# Patient Record
Sex: MALE
[De-identification: names, ages, dates, MRNs, and addresses within clinical notes are randomized per-mention and may not be internally consistent; named-entity substitution may affect disease eponyms.]

---

## 2019-12-30 ENCOUNTER — HOSPITAL ENCOUNTER (INPATIENT)
Dept: HOSPITAL 92 - ERS | Age: 29
LOS: 7 days | Discharge: HOME | DRG: 871 | End: 2020-01-06
Attending: INTERNAL MEDICINE | Admitting: INTERNAL MEDICINE
Payer: COMMERCIAL

## 2019-12-30 VITALS — BODY MASS INDEX: 79.9 KG/M2

## 2019-12-30 DIAGNOSIS — J96.22: ICD-10-CM

## 2019-12-30 DIAGNOSIS — E87.2: ICD-10-CM

## 2019-12-30 DIAGNOSIS — G89.29: ICD-10-CM

## 2019-12-30 DIAGNOSIS — E66.2: ICD-10-CM

## 2019-12-30 DIAGNOSIS — L03.116: ICD-10-CM

## 2019-12-30 DIAGNOSIS — E87.1: ICD-10-CM

## 2019-12-30 DIAGNOSIS — R65.20: ICD-10-CM

## 2019-12-30 DIAGNOSIS — Z79.899: ICD-10-CM

## 2019-12-30 DIAGNOSIS — M54.5: ICD-10-CM

## 2019-12-30 DIAGNOSIS — A41.9: Primary | ICD-10-CM

## 2019-12-30 DIAGNOSIS — J96.21: ICD-10-CM

## 2019-12-30 DIAGNOSIS — Z23: ICD-10-CM

## 2019-12-30 LAB
ALBUMIN SERPL BCG-MCNC: 3.6 G/DL (ref 3.5–5)
ALP SERPL-CCNC: 89 U/L (ref 40–110)
ALT SERPL W P-5'-P-CCNC: 26 U/L (ref 8–55)
ANION GAP SERPL CALC-SCNC: 10 MMOL/L (ref 10–20)
AST SERPL-CCNC: 21 U/L (ref 5–34)
BILIRUB SERPL-MCNC: 0.7 MG/DL (ref 0.2–1.2)
BUN SERPL-MCNC: 12 MG/DL (ref 8.9–20.6)
CALCIUM SERPL-MCNC: 8.5 MG/DL (ref 7.8–10.44)
CHLORIDE SERPL-SCNC: 96 MMOL/L (ref 98–107)
CO2 SERPL-SCNC: 31 MMOL/L (ref 22–29)
CREAT CL PREDICTED SERPL C-G-VRATE: 0 ML/MIN (ref 70–130)
GLOBULIN SER CALC-MCNC: 4.1 G/DL (ref 2.4–3.5)
GLUCOSE SERPL-MCNC: 107 MG/DL (ref 70–105)
HGB BLD-MCNC: 15.8 G/DL (ref 14–18)
MCH RBC QN AUTO: 26.3 PG (ref 27–31)
MCV RBC AUTO: 84.8 FL (ref 78–98)
MDIFF COMPLETE?: YES
PLATELET # BLD AUTO: 193 THOU/UL (ref 130–400)
POTASSIUM SERPL-SCNC: 3.9 MMOL/L (ref 3.5–5.1)
RBC # BLD AUTO: 6 MILL/UL (ref 4.7–6.1)
SODIUM SERPL-SCNC: 133 MMOL/L (ref 136–145)
WBC # BLD AUTO: 28 THOU/UL (ref 4.8–10.8)

## 2019-12-30 PROCEDURE — 96361 HYDRATE IV INFUSION ADD-ON: CPT

## 2019-12-30 PROCEDURE — 80202 ASSAY OF VANCOMYCIN: CPT

## 2019-12-30 PROCEDURE — 96366 THER/PROPH/DIAG IV INF ADDON: CPT

## 2019-12-30 PROCEDURE — 85025 COMPLETE CBC W/AUTO DIFF WBC: CPT

## 2019-12-30 PROCEDURE — 87040 BLOOD CULTURE FOR BACTERIA: CPT

## 2019-12-30 PROCEDURE — 80053 COMPREHEN METABOLIC PANEL: CPT

## 2019-12-30 PROCEDURE — 87149 DNA/RNA DIRECT PROBE: CPT

## 2019-12-30 PROCEDURE — 82805 BLOOD GASES W/O2 SATURATION: CPT

## 2019-12-30 PROCEDURE — 83735 ASSAY OF MAGNESIUM: CPT

## 2019-12-30 PROCEDURE — 83605 ASSAY OF LACTIC ACID: CPT

## 2019-12-30 PROCEDURE — 5A09357 ASSISTANCE WITH RESPIRATORY VENTILATION, LESS THAN 24 CONSECUTIVE HOURS, CONTINUOUS POSITIVE AIRWAY PRESSURE: ICD-10-PCS | Performed by: INTERNAL MEDICINE

## 2019-12-30 PROCEDURE — 36415 COLL VENOUS BLD VENIPUNCTURE: CPT

## 2019-12-30 PROCEDURE — 96365 THER/PROPH/DIAG IV INF INIT: CPT

## 2019-12-30 PROCEDURE — 87804 INFLUENZA ASSAY W/OPTIC: CPT

## 2019-12-30 PROCEDURE — 80048 BASIC METABOLIC PNL TOTAL CA: CPT

## 2019-12-30 PROCEDURE — G0008 ADMIN INFLUENZA VIRUS VAC: HCPCS

## 2019-12-30 PROCEDURE — 94660 CPAP INITIATION&MGMT: CPT

## 2019-12-30 PROCEDURE — 71045 X-RAY EXAM CHEST 1 VIEW: CPT

## 2019-12-30 PROCEDURE — 76999 ECHO EXAMINATION PROCEDURE: CPT

## 2019-12-30 PROCEDURE — 90686 IIV4 VACC NO PRSV 0.5 ML IM: CPT

## 2019-12-30 PROCEDURE — 93005 ELECTROCARDIOGRAM TRACING: CPT

## 2019-12-30 PROCEDURE — 90471 IMMUNIZATION ADMIN: CPT

## 2019-12-30 NOTE — RAD
EXAM:

Portable chest



PROVIDED CLINICAL HISTORY:

Dyspnea



COMPARISON:

6/2/2019



FINDINGS:

Cardiac silhouette remains prominently enlarged. No focal consolidation, pleural fluid or pneumothora
x evident.



IMPRESSION:

No evidence for an acute cardiopulmonary process.



Reported By: Niraj Macedo 

Electronically Signed:  12/30/2019 8:31 PM

## 2019-12-31 LAB
ANALYZER IN CARDIO: (no result)
ANION GAP SERPL CALC-SCNC: 9 MMOL/L (ref 10–20)
BASE EXCESS STD BLDA CALC-SCNC: 0.9 MEQ/L
BUN SERPL-MCNC: 10 MG/DL (ref 8.9–20.6)
CA-I BLDA-SCNC: 1.09 MMOL/L (ref 1.12–1.3)
CALCIUM SERPL-MCNC: 8 MG/DL (ref 7.8–10.44)
CHLORIDE SERPL-SCNC: 100 MMOL/L (ref 98–107)
CO2 SERPL-SCNC: 29 MMOL/L (ref 22–29)
CREAT CL PREDICTED SERPL C-G-VRATE: 417 ML/MIN (ref 70–130)
GLUCOSE SERPL-MCNC: 103 MG/DL (ref 70–105)
HCO3 BLDA-SCNC: 28.1 MEQ/L (ref 22–28)
HCT VFR BLDA CALC: 40 % (ref 42–52)
HGB BLDA-MCNC: 13.6 G/DL (ref 14–18)
PCO2 BLDA: 55.8 MMHG (ref 35–45)
PH BLDA: 7.32 [PH] (ref 7.35–7.45)
PO2 BLDA: 54.1 MMHG (ref 80–100)
POTASSIUM BLD-SCNC: 3.71 MMOL/L (ref 3.7–5.3)
POTASSIUM SERPL-SCNC: 4.2 MMOL/L (ref 3.5–5.1)
SODIUM SERPL-SCNC: 134 MMOL/L (ref 136–145)
SPECIMEN DRAWN FROM PATIENT: (no result)
VANCOMYCIN TROUGH SERPL-MCNC: 11.3 UG/ML

## 2019-12-31 RX ADMIN — CEFTRIAXONE SCH MLS: 2 INJECTION, POWDER, FOR SOLUTION INTRAMUSCULAR; INTRAVENOUS at 08:18

## 2019-12-31 NOTE — PDOC.EVN
Event Note





- Event Note


Event Note: 


Patient with progression of the cellulitis. Low grade fever. Persistent 

tachycardia, but no hypotension, actually running high. Lactic acid normalized 

with 3rd liter of NS. Patient groggy after Morphine, though still arousable and 

talking. ABG with mildly dipped pH, CO2 in 50s, low O2. COncerned about the 

possibility of deterioration of respiratory status. Will transfer to the IMCU 

for closer observation and BiPAP if needed. Continue IV fluids, Vancomycin. 

Decrease IV fluids. ID consulted for recurrent cellulitis refractory to 

suppression therapy.

## 2019-12-31 NOTE — HP
PRIMARY CARE PHYSICIAN:  [facility].



CHIEF COMPLAINT:  Generalized weakness.



HISTORY OF PRESENT ILLNESS:  The patient is a 29-year-old male with morbid obesity

with a BMI 79.9, presented to the emergency room with above complaints. 



In September of this year, he was admitted with a diagnosis of sepsis secondary to

left lower extremity cellulitis.  He was discharged on Keflex for 10 days, followed

by Pen-Vee K 250 mg twice daily for 1 year. 



Over the last 3 days, the patient noticed swelling along with warmth and redness of

his left lower extremity that is progressively getting worse.  The symptoms got

worse today.  He started having fever along with chills and generalized weakness

earlier today.  He denies any trauma.  The pain was severe in intensity, worse on

movement.  He denies recent immobilization, or travel.  He is compliant with Keflex

for prophylaxis. 



In the emergency room, initial vital signs showed temperature 103.1, respirations of

34, pulse rate of 125 with a blood pressure of 134/86, O2 saturation of 97% on room

air.  He received IV vancomycin along with IV fluids in the emergency room. 



PAST MEDICAL HISTORY:  

1. Recurrent cellulitis of left lower extremity.

2. Morbid obesity with a BMI of 79.9.

3. Obstructive sleep apnea, on CPAP.

4. Chronic low back pain.

5. Obesity hypoventilation syndrome.



PAST SURGICAL HISTORY:  Reviewed with the patient and none.



ALLERGIES:  NO KNOWN DRUG ALLERGIES.



CURRENT HOME MEDICATIONS:  The patient is currently on antibiotics.  He is unable to

recall the names. 



SOCIAL HISTORY:  The patient currently lives at home.  He is a former smoker.  No

current use of alcohol or drug use. 



FAMILY HISTORY:  Negative for heart disease.



REVIEW OF SYSTEMS:  All other review of systems was reviewed and were found negative.



PHYSICAL EXAMINATION:

VITAL SIGNS:  As discussed above. 

GENERAL:  A 29-year-old male, morbidly obese, in no apparent distress. 

HEENT:  Head, atraumatic and normocephalic.  Sclerae anicteric.  No oral lesion. 

NECK:  Supple.  No JVD.  No carotid bruit. 

LUNGS:  Showed diminished air entry at bilateral bases.  No rhonchi or rales. 

HEART:  S1, S2 present.  Regular.  Tachycardic.  No rubs or gallops. 

ABDOMEN:  Soft, obese.  Bowel sounds present. 

EXTREMITIES:  There is significant swelling along with warmth and tenderness of the

left leg.  There is tenderness on palpation as well. 

SKIN:  As discussed above. 

NEUROLOGIC:  Grossly nonfocal.  Moves all 4 extremities.  Power was 5/5 in all

extremities PSYCHIATRY:  Alert, awake, and oriented x3. 

LYMPH NODES:  No palpable lymph nodes in the neck or groin. 

PERIPHERAL VASCULAR:  Radial pulses and dorsalis pedis pulses were palpable.



LABORATORY FINDINGS:  WBC 28 with 15% bandemia, hemoglobin 15.8.  Chemistry showed

sodium 133, potassium 3.9, chloride 96, bicarb 31.  Lactic acid 2.7.  Total

bilirubin, AST, ALT, and alkaline phosphatase were normal.  Influenza screen was

negative. 



IMAGING STUDIES:  Chest x-ray by my review was negative for infiltrate.  EKG by my

review showed sinus tachycardia. 



IMPRESSION:  

1. Severe sepsis secondary to left lower extremity cellulitis.

2. Morbid obesity with a BMI 79.9 with obesity hypoventilation syndrome.

3. Obstructive sleep apnea, on CPAP at home.

4. Hyponatremia.

5. Lactic acidosis secondary to sepsis.

6. Chronic low back pain.



PLAN:  The patient will be monitored on the medical floor.  We will continue

vancomycin.  We will add ceftriaxone.  Bilateral lower extremity elevation.  Left

lower extremity Doppler has been done, report is pending at this time.  DVT

prophylaxis.  Confirm home medications.  We will resume CPAP.  Continuous pulse

oximetry for now until the CPAP is arranged. 



Plan of care was discussed with the patient.  He stated understanding.







Job ID:  [id number]

## 2019-12-31 NOTE — ULT
PRELIMINARY REPORT/[facility]/AFTER HOURS PROCEDURE

 

LEFT LOWER EXTREMITY VENOUS DUPLEX ULTRASOUND:

 

CLINICAL HISTORY:

LLE pain/edema/redness. Difficult study due to obesity. No LLE DVT seen.

 

TECHNIQUE:

Real-time ultrasound scan of the veins of the left lower extremity with color Doppler flow, spectral 
waveform analysis and compression.

 

COMPARISON:

None provided.

 

FINDINGS:

DEEP VEINS: The common femoral, femoral, and popliteal veins are echolucent and compressible. These v
essels demonstrate respiratory variation and augmentation. There is normal color Doppler flow through
out. The visualized calf veins are also patent.

 

SUPERFICIAL VEINS: The visualized greater saphenous vein is patent.

 

SOFT TISSUES: No popliteal fossa cyst or other abnormalities.

 

IMPRESSION:

No deep venous thrombosis in the left lower extremity.

 

ELECTRONICALLY SIGNED BY:

Elan Tillman MD

Dec 31, 2019 2:15:24 AM CST

 

This report is intended for review by the ordering physician only, in accordance of law. If you recei
ve this report in error, please call [facility] at [phone].

 

 

FINAL REPORT

 

LEFT LOWER EXTREMITY VENOUS DOPPLER ULTRASOUND:

 

I agree with the preliminary report given by Dr. Elan Tillman of [facility].

 

CODE QA

 

POS: [facility]

## 2019-12-31 NOTE — ULT
Ultrasound left thigh:



HISTORY:

29-year-old male with swelling of the left thigh.



FINDINGS:

At the medial aspect of the thigh, there is moderate to severe superficial soft tissue edema. The molly
p soft tissues are not visualized. No organized fluid collection is identified (no evidence of

abscess or hematoma).



IMPRESSION:

Soft tissue edema of the left medial thigh.



Reported By: Sami Abrams 

Electronically Signed:  12/31/2019 5:30 PM

## 2020-01-01 LAB
ANION GAP SERPL CALC-SCNC: 9 MMOL/L (ref 10–20)
BUN SERPL-MCNC: 11 MG/DL (ref 8.9–20.6)
CALCIUM SERPL-MCNC: 8 MG/DL (ref 7.8–10.44)
CHLORIDE SERPL-SCNC: 101 MMOL/L (ref 98–107)
CO2 SERPL-SCNC: 29 MMOL/L (ref 22–29)
CREAT CL PREDICTED SERPL C-G-VRATE: 452 ML/MIN (ref 70–130)
GLUCOSE SERPL-MCNC: 116 MG/DL (ref 70–105)
HGB BLD-MCNC: 12.9 G/DL (ref 14–18)
MCH RBC QN AUTO: 27.3 PG (ref 27–31)
MCV RBC AUTO: 86.4 FL (ref 78–98)
MDIFF COMPLETE?: YES
PLATELET # BLD AUTO: 155 THOU/UL (ref 130–400)
POTASSIUM SERPL-SCNC: 4.2 MMOL/L (ref 3.5–5.1)
RBC # BLD AUTO: 4.71 MILL/UL (ref 4.7–6.1)
SODIUM SERPL-SCNC: 135 MMOL/L (ref 136–145)
WBC # BLD AUTO: 28.9 THOU/UL (ref 4.8–10.8)

## 2020-01-01 PROCEDURE — 3E02340 INTRODUCTION OF INFLUENZA VACCINE INTO MUSCLE, PERCUTANEOUS APPROACH: ICD-10-PCS | Performed by: INTERNAL MEDICINE

## 2020-01-01 RX ADMIN — CEFTRIAXONE SCH MLS: 2 INJECTION, POWDER, FOR SOLUTION INTRAMUSCULAR; INTRAVENOUS at 10:04

## 2020-01-01 NOTE — PDOC.HOSPP
- Subjective


Encounter Date: 01/01/20


Encounter Time: 13:00


Subjective: 


Patient better today. Was on BiPap overnight. Got a little sleepy again this AM 

with tramadol, but not as bad as with morphine last night. Pain in leg 

minimally better.





- Objective


Vital Signs & Weight: 


 Vital Signs (12 hours)











  Temp Pulse Resp Pulse Ox


 


 01/01/20 07:51   85  20  99


 


 01/01/20 07:32     95


 


 01/01/20 07:13  98.0 F   


 


 01/01/20 04:00  99.2 F   


 


 01/01/20 03:28   103 H  27 H  100


 


 01/01/20 00:00  98.9 F   








 Weight











Weight                         465 lb 8 oz











 Most Recent Monitor Data











Heart Rate from ECG            90


 


NIBP                           115/55


 


NIBP BP-Mean                   75


 


Respiration from ECG           26


 


SpO2                           97














I&O: 


 











 12/31/19 01/01/20 01/02/20





 06:59 06:59 06:59


 


Intake Total 474 5110 


 


Balance 474 5110 











Result Diagrams: 


 01/01/20 03:22





 01/01/20 03:21





Hospitalist ROS





- Review of Systems


Constitutional: denies: fever, chills


Respiratory: denies: cough, shortness of breath


Cardiovascular: denies: chest pain, palpitations


Gastrointestinal: denies: nausea, vomiting, abdominal pain


Musculoskeletal: reports: leg pain


Skin: reports: rash





- Medication


Medications: 


Active Medications











Generic Name Dose Route Start Last Admin





  Trade Name Freq  PRN Reason Stop Dose Admin


 


Acetaminophen  650 mg  12/31/19 02:57  01/01/20 06:19





  Tylenol  PO   650 mg





  Q4H PRN   Administration





  Headache/Fever/Mild Pain (1-3)   





     





     





     


 


Enoxaparin Sodium  40 mg  12/31/19 09:00  12/31/19 08:18





  Lovenox  SC   40 mg





  0900 PAULINO   Administration





     





     





     





     


 


Famotidine  20 mg  12/31/19 09:00  12/31/19 21:59





  Pepcid  PO   20 mg





  BID PAULINO   Administration





     





     





     





     


 


Ceftriaxone Sodium 2 gm/  100 mls @ 200 mls/hr  12/31/19 09:00  12/31/19 08:18





  Sodium Chloride  IVPB   100 mls





  DAILY PAULINO   Administration





     





     





     





     


 


Vancomycin HCl 2 gm/ Sodium  500 mls @ 250 mls/hr  12/31/19 05:00  01/01/20 06:

24





  Chloride  IVPB   500 mls





  0500,1300,2100 PAULINO   Administration





     





     





     





     


 


Sodium Chloride  1,000 mls @ 120 mls/hr  12/31/19 09:30  12/31/19 20:40





  Normal Saline 0.9%  IV   1,000 mls





  .Q8H20M PAULINO   Administration





     





     





     





     


 


Morphine Sulfate  4 mg  12/31/19 14:33  12/31/19 14:39





  Morphine  SLOW IVP   4 mg





  Q4H PRN   Administration





  Severe Pain (7-10)   





     





     





     


 


Ondansetron HCl  4 mg  12/31/19 02:57  01/01/20 06:19





  Zofran Odt  PO   4 mg





  Q6H PRN   Administration





  Nausea/Vomiting   





     





     





     


 


Ondansetron HCl  4 mg  12/31/19 02:57  12/31/19 08:38





  Zofran  IVP   4 mg





  Q6H PRN   Administration





  Nausea/Vomiting   





     





     





     


 


Saccharomyces Boulardii  250 mg  12/31/19 09:00  12/31/19 08:20





  Florastor  PO   250 mg





  DAILY PAULINO   Administration





     





     





     





     


 


Tramadol HCl  100 mg  12/31/19 14:11  01/01/20 06:18





  Ultram  PO   100 mg





  Q6H PRN   Administration





  Moderate to Severe Pain (6-10)   





     





     





     














- Exam


General Appearance: NAD, awake alert


ENT: moist mucosa


Heart: RRR, no murmur, no gallops, no rubs


Respiratory: CTAB, no wheezes, no rales, no ronchi


Gastrointestinal: soft, non-tender, non-distended, normal bowel sounds


Extremities: 1+ LE edema


Skin - other findings: LLE cellulitis without progression, maybe mild withdraw 

from line


Psychiatric: normal affect, normal behavior, A&O x 3





Hosp A/P


(1) Left leg cellulitis


Code(s): L03.116 - CELLULITIS OF LEFT LOWER LIMB   Status: Acute   





(2) Sepsis


Code(s): A41.9 - SEPSIS, UNSPECIFIED ORGANISM   Status: Acute   





(3) Lactic acidosis


Code(s): E87.2 - ACIDOSIS   Status: Resolved   





(4) Morbid obesity


Code(s): E66.01 - MORBID (SEVERE) OBESITY DUE TO EXCESS CALORIES   Status: 

Chronic   





(5) Sleep apnea


Code(s): G47.30 - SLEEP APNEA, UNSPECIFIED   Status: Chronic   





(6) Acute on chronic respiratory failure with hypoxia and hypercapnia


Code(s): J96.21 - ACUTE AND CHRONIC RESPIRATORY FAILURE WITH HYPOXIA; J96.22 - 

ACUTE AND CHRONIC RESPIRATORY FAILURE WITH HYPERCAPNIA   Status: Acute   





- Plan


continue antibiotics, respiratory therapy


Patient on Vancomycin and Rocephin since 12/31/2019


ID consult, Pulm consult


Somnolent after pain meds last night, moved to IMCU and Bipap with improvement


Severe tachycardia now resolved, still with significant leukocytosis


DVT prophylaxis- Lovenox

## 2020-01-01 NOTE — CON
DATE OF CONSULTATION:  2020



REASON FOR CONSULTATION:  [facility] patient/obesity hypoventilation syndrome.



HISTORY OF PRESENT ILLNESS:  The patient is a 29-year-old  male with 
past

medical history significant for obesity hypoventilation syndrome and

obstructive sleep apnea.  He had a CPAP at home for a period of time, but it 
broke

several months ago, and since then, he had been on the noninvasive therapy.  He 
has

been sleeping with pillows on an incline on his face.  Apparently, his wife has 
been

observing him sleep for a couple of minutes and suggested that he is sleeping 
just

fine.  As such, they never pursued getting his noninvasive therapy replaced.  
Either

way, he was in his usual state of health when he started having generalized

weakness.  He presented to the emergency department.  He was diagnosed with

cellulitis, initiated on some broad-spectrum antibiotics.  Overnight, he has 
had a

profound improvement in symptoms.  Initially on presentation, he had a 
temperature

of 103, which is resolved. 



PAST MEDICAL HISTORY:  

1. Morbid obesity.

2. Obesity hypoventilation syndrome.

3. Obstructive sleep apnea (not currently on CPAP).

4. Chronic low back pain.

5. Recurrent cellulitis of the left lower extremity.



PAST SURGICAL HISTORY:  None.



SOCIAL HISTORY:  Negative for alcohol, tobacco, or illicit drug use currently.  
He

is a former smoker. 



FAMILY HISTORY:  Noncontributory.



ALLERGIES:  NO KNOWN DRUG ALLERGIES.



MEDICATIONS:  List of the patient's inpatient medications were reviewed.  No

specific updates were made at this time. 



REVIEW OF SYSTEMS:  General, head, ears, eyes, nose, throat, cardiovascular,

respiratory, GI, , musculoskeletal, neurologic, and skin are negative except 
as

mentioned in the HPI. 



PHYSICAL EXAMINATION:

VITAL SIGNS:  Afebrile currently with a T-max of 100.6, pulse 82, blood pressure

136/77, respirations 21, saturation 92% on 2 L nasal cannula currently. 

GENERAL:  The patient is awake and alert.  He is fully conversive. 

HEENT:  Normocephalic and atraumatic.  Sclerae white.  Conjunctivae pink.  Oral

mucosa is moist without lesions. 

LUNGS:  Very good air entry.  Dependent crackles are noted. 

HEART:  Normal rate and regular. 

ABDOMEN:  Soft, nontender, and nondistended.  Bowel sounds are positive. 

MUSCULOSKELETAL:  No cyanosis or clubbing.  There is 2+ pitting in the left 
lower

extremity and trace pitting in the right lower extremity.  The left lower 
extremity

is red. 



LABORATORY DATA:  WBC 28.9, hemoglobin 12.9, and platelets 155,000.  
Neutrophils are

86% on top of 6% band, which is decreasing.  PH 7.32, pCO2 56, PO2 54, 
corresponding

to a saturation 87% while wearing BiPAP with 24% FiO2.  Basic metabolic profile 
is

unremarkable.  Lactic acid was recently 2.7, but clear to 1.3.  Liver function

studies are unremarkable.  Blood cultures are growing coag-negative Staph in 1.

Influenza A and B are negative today. 



IMAGIN. Vascular ultrasound demonstrates no evidence of DVT.

2. Soft tissue ultrasound demonstrates some soft tissue swelling, but no 
evidence of

focal lesion, or abscess. 

3. Chest x-ray demonstrates a large heart but no overt acute cardiopulmonary

abnormalities. 



ASSESSMENT:  

1. Acute-on-chronic hypoxic and hypercapnic respiratory failure.

2. Obesity hypoventilation syndrome.

3. Obstructive sleep apnea.

4. Severe sepsis.

5. Cellulitis of the left lower extremity.



DISCUSSION AND PLAN:  We will diurese the patient on a daily basis.  His first 
dose

will be today as he is slightly volume up.  We will continue antibiotics per ID

direction.  The patient will need to pursue replacing his BiPAP in the 
outpatient

setting.  His respiratory symptoms are currently at baseline.  We will leave 
him in

the IMCU for 1 more night, so that he can continue noninvasive therapy here.

Pulmonary/Critical Care will continue to follow for the time being. 



70 minutes have been devoted to this patient in various activities.  I 
personally reviewed all imaging studies and laboratory data noted within this 
document.  For fifty percent of this time, I was interacting with the patient 
at the bedside or coordinating care with the care team.  For the remainder of 
the time I was immediately available to the patient in the hospital unit.  



Job ID:  [id number]



MTDD

## 2020-01-01 NOTE — HP
CONSULT REPORT:



REASON:  Recrudescence of cellulitis of left lower extremity.



HISTORY OF PRESENT ILLNESS:  A 29-year-old male whom I had seen in September of 
last

year, who has a history of obesity, sleep apnea, lymphedema, and venous

insufficiency with recurrent episodes of cellulitis.  The first episode I saw 
him

that was in 2016, and he was stopped his suppression, and he was admitted in

September.  Same regimen was started, but reportedly, the patient was taking 
only

once a day Penicillin VK instead of twice daily and he has now had the

recrudescence, which started 3 days before admission, very sudden onset typical 
of

beta-hemolytic streptococcal cellulitis.  The prior inflammatory process 
extends all

the way to the medial thigh on the left side.  No headaches.  No visual symptoms
,

sore throat, odynophagia, or dysphagia.  No cough or sputum production.  No 
chest

pain.  No abdominal pain.  No diarrhea.  No genitourinary symptoms. 



PAST MEDICAL HISTORY:  Obesity; sleep apnea; recurrent cellulitis; venous

insufficiency of lower extremities; and intertrigo in the abdominal area. 



SOCIAL HISTORY:  He works as a .  Smokes intermittently.  No alcoholic

beverage use or drug use. 



FAMILY HISTORY:  Noncontributory.



ALLERGIES:  NONE.



MEDICATIONS:  

1. Ceftriaxone.

3. Zofran.

4. Tramadol.

5. Vancomycin.



PHYSICAL EXAMINATION:

VITAL SIGNS:  Temperature max 99.6, seems to be defervescing.  Other vital 
signs are

normal. 

SKIN:  Shows the area of erythema ascending from the ankle all the way to the 
left

medial thigh.  It is quite tender.  The left lower extremity is swollen.  Stasis

dermatitis in the right lower extremity.  Peripheral IV access, and he is 
voiding in

the urinal. 

HEENT:  Noncontributory. 

NECK:  Supple. 

LUNGS:  Symmetric.  Clear breath sounds. 

HEART:  S1 and S2, regular rate.  No S3 or S4. 

ABDOMEN:  Soft.  Not distended or tender.  No ascites.  No bladder distention. 

EXTREMITIES:  Pulses 1+ in dorsalis pedis. 

NEUROLOGIC:  Nonfocal.



LABORATORY DATA:  White blood cell count, hemoglobin 12, bands are down

from 15 to 6.  Creatinine 0.72.  Two sets of blood cultures with coagulase-
negative

Staph likely contaminant.  Influenza antigen test, negative.  There is a 
vascular

ultrasound from December 31st that revealed no evidence of deep venous 
thrombosis. 



ASSESSMENT:  Obesity with recurrent cellulitis.



DISCUSSION:  It looks like this problem is that he was taken half of the 
recommended

dose of Penicillin VK that probably explains the recurrence.  I emphasized that 
he

needs to take Penicillin  mg twice daily for prophylaxis rather than once

daily.  Continue Rocephin, discontinue vancomycin.  Once there is further

improvement, transition to oral Keflex and then transition to Penicillin  
mg

twice daily for a year.  Because of his size, may even consider giving him 
double

the dose 500 mg twice a day. 







Job ID:  [id number]



[facility]

## 2020-01-02 LAB
ANION GAP SERPL CALC-SCNC: 13 MMOL/L (ref 10–20)
BUN SERPL-MCNC: 11 MG/DL (ref 8.9–20.6)
CALCIUM SERPL-MCNC: 8.4 MG/DL (ref 7.8–10.44)
CHLORIDE SERPL-SCNC: 101 MMOL/L (ref 98–107)
CO2 SERPL-SCNC: 25 MMOL/L (ref 22–29)
CREAT CL PREDICTED SERPL C-G-VRATE: 452 ML/MIN (ref 70–130)
GLUCOSE SERPL-MCNC: 97 MG/DL (ref 70–105)
MAGNESIUM SERPL-MCNC: 1.7 MG/DL (ref 1.6–2.6)
POTASSIUM SERPL-SCNC: 4.6 MMOL/L (ref 3.5–5.1)
SODIUM SERPL-SCNC: 134 MMOL/L (ref 136–145)

## 2020-01-02 RX ADMIN — CEFTRIAXONE SCH MLS: 2 INJECTION, POWDER, FOR SOLUTION INTRAMUSCULAR; INTRAVENOUS at 08:46

## 2020-01-02 NOTE — PRG
DATE OF SERVICE:  01/02/2020



SERVICE:  Pulmonary Medicine.



INTERVAL HISTORY:  The patient is doing okay from respiratory standpoint.  He is

breathing comfortably.  There has been no interval change to his condition.  Denies

any current chest discomfort, fevers, or chills.  His leg discomfort is much

improved. 



PHYSICAL EXAMINATION:

VITAL SIGNS:  Afebrile, pulse 92, blood pressure 173/120, respirations 18, and

saturation 100%, currently on room air. 

GENERAL:  The patient is awake and alert, in no apparent distress. 

LUNGS:  Decent air entry with minimal dependent crackles present. 

HEART:  Normal rate.  Regular. 

ABDOMEN:  Soft, nontender, and nondistended.  Bowel sounds are positive. 

MUSCULOSKELETAL:  No cyanosis or clubbing.  There is 1 to 2+ pitting in the

bilateral lower extremities, it is little worse on the left.  The erythema has

improved. 



LABORATORY DATA:  WBC 28.9.  Band count is dropping.  A pH of 7.32, pCO2 of 56, and

pO2 of 54.  Basic metabolic profile is essentially unremarkable.  Magnesium 1.7.

Influenza A and B are negative.  One out of two blood cultures is growing

coag-negative Staph. 



ASSESSMENT:  

1. Acute on chronic hypoxic and hypercapnic respiratory failure, resolved to

baseline. 

2. Obesity hypoventilation syndrome.

3. Obstructive sleep apnea, requiring bilevel positive airway pressure out of the

hospital. 

4. Obstructive sleep apnea.

5. Severe sepsis.

6. Cellulitis of the left lower extremity.



DISCUSSION AND PLAN:  We will continue to diurese the patient down to euvolemia.  I

will give him a laboratory holiday tomorrow morning.  Magnesium will be replaced.

At 

this point, he is stable for transition out of the ICU to the medical unit.

Pulmonary will follow for now. 







Job ID:  [id number]

## 2020-01-02 NOTE — PDOC.HOSPP
- Subjective


Encounter Date: 01/02/20


Encounter Time: 12:20


Subjective: 


Patient much more awake today. On BiPAP over night. Pain improved, but still 

hurting a lot. No more fever or tachycardia.





- Objective


Vital Signs & Weight: 


 Vital Signs (12 hours)











  Temp Pulse Resp Pulse Ox


 


 01/02/20 07:14  97.7 F   


 


 01/02/20 03:43  98.5 F   


 


 01/02/20 03:34   96  26 H  94 L


 


 01/02/20 03:11     93 L


 


 01/01/20 23:52  98.6 F   








 Weight











Weight                         465 lb 8 oz











 Most Recent Monitor Data











Heart Rate from ECG            73


 


NIBP                           133/80


 


NIBP BP-Mean                   97


 


Respiration from ECG           19


 


SpO2                           100














I&O: 


 











 01/01/20 01/02/20 01/03/20





 06:59 06:59 06:59


 


Intake Total 5110 2670 


 


Output Total  850 


 


Balance 5110 1820 











Result Diagrams: 


 01/01/20 03:22





 01/02/20 03:57





Hospitalist ROS





- Review of Systems


Constitutional: denies: fever, chills


Respiratory: denies: cough, shortness of breath


Cardiovascular: denies: chest pain, palpitations, orthopnea


Gastrointestinal: denies: nausea, vomiting, abdominal pain


Musculoskeletal: reports: leg pain


Skin: reports: rash





- Medication


Medications: 


Active Medications











Generic Name Dose Route Start Last Admin





  Trade Name Freq  PRN Reason Stop Dose Admin


 


Acetaminophen  650 mg  12/31/19 02:57  01/01/20 06:19





  Tylenol  PO   650 mg





  Q4H PRN   Administration





  Headache/Fever/Mild Pain (1-3)   





     





     





     


 


Enoxaparin Sodium  40 mg  12/31/19 09:00  01/02/20 08:46





  Lovenox  SC   40 mg





  0900 PAULINO   Administration





     





     





     





     


 


Famotidine  20 mg  12/31/19 09:00  01/02/20 08:56





  Pepcid  PO   20 mg





  BID PAULINO   Administration





     





     





     





     


 


Furosemide  40 mg  01/02/20 09:00  01/02/20 08:46





  Lasix  SLOW IVP  01/04/20 09:01  40 mg





  DAILY PAULINO   Administration





     





     





     





     


 


Ceftriaxone Sodium 2 gm/  100 mls @ 200 mls/hr  12/31/19 09:00  01/02/20 08:46





  Sodium Chloride  IVPB   100 mls





  DAILY PAULINO   Administration





     





     





     





     


 


Ondansetron HCl  4 mg  12/31/19 02:57  01/01/20 06:19





  Zofran Odt  PO   4 mg





  Q6H PRN   Administration





  Nausea/Vomiting   





     





     





     


 


Ondansetron HCl  4 mg  12/31/19 02:57  12/31/19 08:38





  Zofran  IVP   4 mg





  Q6H PRN   Administration





  Nausea/Vomiting   





     





     





     


 


Saccharomyces Zeei  250 mg  12/31/19 09:00  01/02/20 08:46





  Florastor  PO   250 mg





  DAILY PAULINO   Administration





     





     





     





     


 


Tramadol HCl  100 mg  12/31/19 14:11  01/01/20 21:53





  Ultram  PO   100 mg





  Q6H PRN   Administration





  Moderate to Severe Pain (6-10)   





     





     





     














- Exam


General Appearance: NAD, awake alert


ENT: moist mucosa


Heart: RRR, no murmur, no gallops, no rubs


Respiratory: CTAB, no wheezes, no rales, no ronchi


Gastrointestinal: soft, non-tender, non-distended, normal bowel sounds


Extremities: 2+ LE edema


Skin - other findings: red cellulitis retreating from line drawn on admission


Psychiatric: normal affect, normal behavior, A&O x 3





Hosp A/P


(1) Left leg cellulitis


Code(s): L03.116 - CELLULITIS OF LEFT LOWER LIMB   Status: Acute   





(2) Sepsis


Code(s): A41.9 - SEPSIS, UNSPECIFIED ORGANISM   Status: Acute   





(3) Lactic acidosis


Code(s): E87.2 - ACIDOSIS   Status: Resolved   





(4) Morbid obesity


Code(s): E66.01 - MORBID (SEVERE) OBESITY DUE TO EXCESS CALORIES   Status: 

Chronic   





(5) Sleep apnea


Code(s): G47.30 - SLEEP APNEA, UNSPECIFIED   Status: Chronic   





(6) Acute on chronic respiratory failure with hypoxia and hypercapnia


Code(s): J96.21 - ACUTE AND CHRONIC RESPIRATORY FAILURE WITH HYPOXIA; J96.22 - 

ACUTE AND CHRONIC RESPIRATORY FAILURE WITH HYPERCAPNIA   Status: Acute   





- Plan


Patient on Vancomycin and Rocephin since 12/31/2019


ID consult, Pulm consult


Somnolence after pain meds so in IMCU and Bipap with improvement


Severe tachycardia now resolved, still with significant leukocytosis


DVT prophylaxis- Lovenox

## 2020-01-03 LAB
ANION GAP SERPL CALC-SCNC: 11 MMOL/L (ref 10–20)
BASOPHILS # BLD AUTO: 0.1 THOU/UL (ref 0–0.2)
BASOPHILS NFR BLD AUTO: 0.7 % (ref 0–1)
BUN SERPL-MCNC: 10 MG/DL (ref 8.9–20.6)
CALCIUM SERPL-MCNC: 8.3 MG/DL (ref 7.8–10.44)
CHLORIDE SERPL-SCNC: 101 MMOL/L (ref 98–107)
CO2 SERPL-SCNC: 27 MMOL/L (ref 22–29)
CREAT CL PREDICTED SERPL C-G-VRATE: 465 ML/MIN (ref 70–130)
EOSINOPHIL # BLD AUTO: 0.2 THOU/UL (ref 0–0.7)
EOSINOPHIL NFR BLD AUTO: 1.5 % (ref 0–10)
GLUCOSE SERPL-MCNC: 93 MG/DL (ref 70–105)
HGB BLD-MCNC: 12.4 G/DL (ref 14–18)
LYMPHOCYTES # BLD: 2 THOU/UL (ref 1.2–3.4)
LYMPHOCYTES NFR BLD AUTO: 18.4 % (ref 21–51)
MCH RBC QN AUTO: 25.1 PG (ref 27–31)
MCV RBC AUTO: 83.6 FL (ref 78–98)
MONOCYTES # BLD AUTO: 1.5 THOU/UL (ref 0.11–0.59)
MONOCYTES NFR BLD AUTO: 13.5 % (ref 0–10)
NEUTROPHILS # BLD AUTO: 7.3 THOU/UL (ref 1.4–6.5)
NEUTROPHILS NFR BLD AUTO: 65.9 % (ref 42–75)
PLATELET # BLD AUTO: 172 THOU/UL (ref 130–400)
POTASSIUM SERPL-SCNC: 3.8 MMOL/L (ref 3.5–5.1)
RBC # BLD AUTO: 4.93 MILL/UL (ref 4.7–6.1)
SODIUM SERPL-SCNC: 135 MMOL/L (ref 136–145)
WBC # BLD AUTO: 11 THOU/UL (ref 4.8–10.8)

## 2020-01-03 RX ADMIN — CEFTRIAXONE SCH MLS: 2 INJECTION, POWDER, FOR SOLUTION INTRAMUSCULAR; INTRAVENOUS at 08:35

## 2020-01-03 NOTE — PRG
DATE OF SERVICE:  01/03/2020



SUBJECTIVE:  The patient, I found him, sitting by the bedside after having 
walked

two laps around the area, now is complaining of swelling of his left leg and is

about the same level of pain as before when he came in. 



OBJECTIVE:  GENERAL:  He has had no fever.  Other vital signs are not 
particularly

remarkable. 

EXTREMITIES:  The left leg is about the same, maybe the erythema is less.  He 
has a

little bit of a follicular nodules there with edema.  This is secondary to the

lymphedema. 

LUNGS:  Clear. 

HEART:  S1 and S2, regular rate. 

ABDOMEN:  Soft.



LABORATORY DATA:  White cell count down to 11,000, hemoglobin 12, platelets 172
, 65%

neutrophils.  Chemistries, not particularly remarkable.  Blood culture with

contaminant coagulase negative Staph. 



ASSESSMENT AND DISCUSSION:  Obesity with recurrent cellulitis.  The patient has 
a

hard time keeping the leg up and that is going to delay his discharge planning,

since improvement is going to be delayed.  May consider an Ace wrap to try to

compress the leg or some other type of compressive dressing.  Wound care could 
apply

a compression with foam and Coban if he can tolerate it.  Continue

Rocephin since it seems to be working particularly in view of decrease in white 
cell

count elevation. 







Job ID:  [id number]



[facility]

## 2020-01-03 NOTE — PDOC.HOSPP
- Subjective


Encounter Date: 01/03/20 (f/u severe sepsis)


Encounter Time: 13:46


Subjective: 





Pt reports the pain is improved, however still present in his left lower leg.  

He denies any new sx.  States his leg continues to be swollen, and has been 

chronically.





- Objective


Vital Signs & Weight: 


 Vital Signs (12 hours)











  Temp Pulse Resp BP BP Pulse Ox


 


 01/03/20 12:00  98.1 F  96  18   157/97 H  91 L


 


 01/03/20 08:00  98.3 F  96  20  134/77   92 L








 Weight











Weight                         465 lb 8 oz











 Most Recent Monitor Data











Heart Rate from ECG            98


 


NIBP                           136/58


 


NIBP BP-Mean                   84


 


Respiration from ECG           23


 


SpO2                           98














I&O: 


 











 01/02/20 01/03/20 01/04/20





 06:59 06:59 06:59


 


Intake Total 2670  


 


Output Total 850  


 


Balance 1820  











Result Diagrams: 


 01/03/20 05:23





 01/03/20 05:23





Hospitalist ROS





- Medication


Medications: 


Active Medications











Generic Name Dose Route Start Last Admin





  Trade Name Joshq  PRN Reason Stop Dose Admin


 


Acetaminophen  650 mg  12/31/19 02:57  01/01/20 06:19





  Tylenol  PO   650 mg





  Q4H PRN   Administration





  Headache/Fever/Mild Pain (1-3)   





     





     





     


 


Enoxaparin Sodium  40 mg  12/31/19 09:00  01/03/20 08:34





  Lovenox  SC   40 mg





  0900 PAULINO   Administration





     





     





     





     


 


Famotidine  20 mg  12/31/19 09:00  01/03/20 08:37





  Pepcid  PO   Not Given





  BID PAULINO   





     





     





     





     


 


Furosemide  40 mg  01/02/20 09:00  01/03/20 08:33





  Lasix  SLOW IVP  01/04/20 09:01  40 mg





  DAILY PAULINO   Administration





     





     





     





     


 


Furosemide  40 mg  01/03/20 12:45  01/03/20 13:23





  Lasix  SLOW IVP  01/03/20 14:45  40 mg





  NOW PAULINO   Administration





     





     





     





     


 


Ceftriaxone Sodium 2 gm/  100 mls @ 200 mls/hr  12/31/19 09:00  01/03/20 08:35





  Sodium Chloride  IVPB   100 mls





  DAILY PAULINO   Administration





     





     





     





     


 


Ondansetron HCl  4 mg  12/31/19 02:57  01/01/20 06:19





  Zofran Odt  PO   4 mg





  Q6H PRN   Administration





  Nausea/Vomiting   





     





     





     


 


Ondansetron HCl  4 mg  12/31/19 02:57  12/31/19 08:38





  Zofran  IVP   4 mg





  Q6H PRN   Administration





  Nausea/Vomiting   





     





     





     


 


Saccharomyces Boulardii  250 mg  12/31/19 09:00  01/03/20 08:34





  Florastor  PO   250 mg





  DAILY PAULINO   Administration





     





     





     





     


 


Sodium Chloride  10 ml  12/31/19 02:57  01/02/20 12:05





  Flush - Normal Saline  IVF   10 ml





  PRN PRN   Administration





  Saline Flush   





     





     





     


 


Tramadol HCl  100 mg  12/31/19 14:11  01/03/20 09:40





  Ultram  PO   100 mg





  Q6H PRN   Administration





  Moderate to Severe Pain (6-10)   





     





     





     














- Exam


General Appearance: NAD


Heart: RRR, no murmur


Respiratory: CTAB, no wheezes, no rales, no ronchi


Gastrointestinal: soft, non-tender, normal bowel sounds


Extremities - other findings: significant edema in LLE with erythema regressing 

from outline


Skin - other findings: ttp along the medial knee to foot with erythema, no palp 

fluctuance


Psychiatric: normal affect





Hosp A/P


(1) Acute on chronic respiratory failure with hypoxia and hypercapnia


Code(s): J96.21 - ACUTE AND CHRONIC RESPIRATORY FAILURE WITH HYPOXIA; J96.22 - 

ACUTE AND CHRONIC RESPIRATORY FAILURE WITH HYPERCAPNIA   Status: Acute   





(2) Cellulitis of left thigh


Code(s): L03.116 - CELLULITIS OF LEFT LOWER LIMB   Status: Acute   





(3) Chronic respiratory failure with hypercapnia


Code(s): J96.12 - CHRONIC RESPIRATORY FAILURE WITH HYPERCAPNIA   Status: 

Chronic   





(4) Obesity hypoventilation syndrome


Code(s): E66.2 - MORBID (SEVERE) OBESITY WITH ALVEOLAR HYPOVENTILATION   Status

: Chronic   





(5) Sepsis


Code(s): A41.9 - SEPSIS, UNSPECIFIED ORGANISM   Status: Acute   


Qualifiers: 


   Sepsis type: sepsis due to unspecified organism   Acute respiratory failure 

type: with hypercapnia 





- Plan





Appreciate Pulm consult - signed off, recs on assisted ventilation for here and 

at home


Appreciate ID consult - Rocephin until clear improvement, then keflex, then 

penicillin daily





continue monitoring WBC count - improved





continue IV furosemide for LE edema





pain meds prn





dvt prophy - ambulatory and lovenox


gi prophy - not indicated


code status full





reviewed plan of care with patient, no questions or further needs at end of 

eval.

## 2020-01-03 NOTE — PRG
DATE OF SERVICE:  01/03/2020



SERVICE:  Pulmonary Medicine.



INTERVAL HISTORY:  The patient is doing really well from a respiratory standpoint.

Breathing comfortably.  He is on room air.  Denies any current chest discomfort,

nausea, vomiting, fevers, or chills.  Otherwise, there has been no interval change

to his condition.  He still has complaints of lower extremity swelling on the left.

He is also blistering up a little bit.  This happens to him from time to time at

home.  He is interested on being on some Lasix long-term. 



PHYSICAL EXAMINATION:

VITAL SIGNS:  Afebrile, pulse 96, blood pressure 134/77, respirations 20, and

saturation 92%, currently on room air. 

GENERAL:  The patient is awake and alert, in no apparent distress. 

LUNGS:  Decent air entry.  There is no prolonged expiratory phase.  Dependent

crackles are much improved. 

HEART:  Normal rate, regular. 

ABDOMEN:  Soft, nontender, and nondistended.  Bowel sounds are positive. 

MUSCULOSKELETAL:  No cyanosis or clubbing.  There is a 2+ pitting in the left lower

extremity.  No pitting in the right lower extremity. 

NEUROLOGIC:  Grossly nonfocal.



ASSESSMENT:  

1. Acute hypoxic respiratory failure, resolved.

2. Acute on chronic hypercapnic respiratory failure, back to baseline.

3. Obesity hypoventilation syndrome.

4. Obstructive sleep apnea.

5. Severe sepsis.

6. Cellulitis of the left lower extremity.



DISCUSSION AND PLAN:  I am ordering volume ventilation at discharge for nighttime

and as needed daytime use for the treatment of his chronic respiratory failure.

Traditional home BiPAP will be completely insufficient as his morbid obesity is 

predominantly driving a severe hypercapnic failure.  At this point, he has no

further requirements for inpatient Pulmonary or Critical Care opinion, and I will

sign off.  I will have him return to clinic to see me in 2 months in the outpatient

setting with a download of his new home ventilator. 







Job ID:  [id number]

## 2020-01-04 LAB
ANION GAP SERPL CALC-SCNC: 13 MMOL/L (ref 10–20)
BUN SERPL-MCNC: 11 MG/DL (ref 8.9–20.6)
CALCIUM SERPL-MCNC: 8.6 MG/DL (ref 7.8–10.44)
CHLORIDE SERPL-SCNC: 99 MMOL/L (ref 98–107)
CO2 SERPL-SCNC: 29 MMOL/L (ref 22–29)
CREAT CL PREDICTED SERPL C-G-VRATE: 458 ML/MIN (ref 70–130)
GLUCOSE SERPL-MCNC: 89 MG/DL (ref 70–105)
HGB BLD-MCNC: 12.9 G/DL (ref 14–18)
MCH RBC QN AUTO: 26.2 PG (ref 27–31)
MCV RBC AUTO: 82.3 FL (ref 78–98)
MDIFF COMPLETE?: YES
PLATELET # BLD AUTO: 213 THOU/UL (ref 130–400)
POTASSIUM SERPL-SCNC: 4 MMOL/L (ref 3.5–5.1)
RBC # BLD AUTO: 4.92 MILL/UL (ref 4.7–6.1)
SODIUM SERPL-SCNC: 137 MMOL/L (ref 136–145)
WBC # BLD AUTO: 9.8 THOU/UL (ref 4.8–10.8)

## 2020-01-04 RX ADMIN — CEFTRIAXONE SCH MLS: 2 INJECTION, POWDER, FOR SOLUTION INTRAMUSCULAR; INTRAVENOUS at 08:01

## 2020-01-04 NOTE — ULT
LEFT LOWER EXTREMITY VENOUS DUPLEX EXAM:

1/3/20

 

HISTORY: 

Left leg pain and edema. 

 

Real time color Doppler evaluation of the left lower extremity was performed  from groin to calf. Thi
s included evaluation of the common femoral, superficial and profunda femoral, saphenous, popliteal a
nd posterior tibial veins. This shows a patent deep venous system. There is normal compressibility an
d augmentation. There is no evidence of DVT. 

 

IMPRESSION: 

No evidence of DVT of the left lower extremity. 

 

POS: OFF

## 2020-01-05 LAB
ANION GAP SERPL CALC-SCNC: 10 MMOL/L (ref 10–20)
BUN SERPL-MCNC: 9 MG/DL (ref 8.9–20.6)
CALCIUM SERPL-MCNC: 8.6 MG/DL (ref 7.8–10.44)
CHLORIDE SERPL-SCNC: 99 MMOL/L (ref 98–107)
CO2 SERPL-SCNC: 32 MMOL/L (ref 22–29)
CREAT CL PREDICTED SERPL C-G-VRATE: 452 ML/MIN (ref 70–130)
GLUCOSE SERPL-MCNC: 92 MG/DL (ref 70–105)
HGB BLD-MCNC: 12.8 G/DL (ref 14–18)
MCH RBC QN AUTO: 27.5 PG (ref 27–31)
MCV RBC AUTO: 83.2 FL (ref 78–98)
MDIFF COMPLETE?: YES
PLATELET # BLD AUTO: 237 THOU/UL (ref 130–400)
POTASSIUM SERPL-SCNC: 3.9 MMOL/L (ref 3.5–5.1)
RBC # BLD AUTO: 4.66 MILL/UL (ref 4.7–6.1)
SODIUM SERPL-SCNC: 137 MMOL/L (ref 136–145)
WBC # BLD AUTO: 12 THOU/UL (ref 4.8–10.8)

## 2020-01-05 RX ADMIN — CEFTRIAXONE SCH MLS: 2 INJECTION, POWDER, FOR SOLUTION INTRAMUSCULAR; INTRAVENOUS at 09:09

## 2020-01-05 NOTE — PRG
DATE OF SERVICE:  01/05/2020



SUBJECTIVE:  Feeling better, pain is less tender.  No respiratory symptoms or

abdominal pain.  No diarrhea.  Voiding without difficulty.  He had a little bit of

temperature elevation up to 100.9 on January 3rd, but has been afebrile since.  BP

150/90, pulse 92.  The leg still with moderate tenderness, particularly in the calf

region, but less than before.  Seems to be less swelling associated with it, less

erythema.  He had a little blistering.  White cell count went up to 12, hemoglobin

12.8, platelets 237, neutrophil percentage is down to 54.  Microbiology, nothing

new. 



ASSESSMENT AND DISCUSSION:  Obesity, recurrent cellulitis with improvement.

Continue with Rocephin.  Repeat ultrasound was done, which showed no evidence of

deep vein thrombosis.  Foresee another 48 hours before discharge planning on Keflex

and emphasize that, from here on, he will have to take at least twice a day

penicillin VK for prophylaxis instead of once daily as he had been doing before. 







Job ID:  [id number]

## 2020-01-05 NOTE — PDOC.HOSPP
- Subjective


Encounter Date: 01/05/20 (f/u severe sepsis)


Encounter Time: 13:21


Subjective: 


28 y/o male admitted on Dec 31 for severe sepsis with left lower extremity 

cellulitis.


Pt reports feeling better - states less pain, less redness.  Denies any n/v/abd 

pain/diarrhea.





- Objective


Vital Signs & Weight: 


 Vital Signs (12 hours)











  Temp Pulse Resp BP Pulse Ox


 


 01/05/20 07:53  98.4 F  92  20  153/91 H  93 L








 Weight











Admit Weight                   465 lb


 


Weight                         465 lb 8 oz











 Most Recent Monitor Data











Heart Rate from ECG            98


 


NIBP                           136/58


 


NIBP BP-Mean                   84


 


Respiration from ECG           23


 


SpO2                           98














Result Diagrams: 


 01/05/20 05:44





 01/05/20 05:44





Hospitalist ROS





- Medication


Medications: 


Active Medications











Generic Name Dose Route Start Last Admin





  Trade Name Freq  PRN Reason Stop Dose Admin


 


Acetaminophen  650 mg  12/31/19 02:57  01/03/20 20:25





  Tylenol  PO   650 mg





  Q4H PRN   Administration





  Headache/Fever/Mild Pain (1-3)   





     





     





     


 


Enoxaparin Sodium  40 mg  12/31/19 09:00  01/05/20 09:08





  Lovenox  SC   40 mg





  0900 PAULINO   Administration





     





     





     





     


 


Famotidine  20 mg  12/31/19 09:00  01/05/20 09:20





  Pepcid  PO   Not Given





  BID PAULINO   





     





     





     





     


 


Furosemide  40 mg  01/05/20 07:30  01/05/20 09:07





  Lasix  PO   40 mg





  DAILY-AC PAULINO   Administration





     





     





     





     


 


Ceftriaxone Sodium 2 gm/  100 mls @ 200 mls/hr  12/31/19 09:00  01/05/20 09:09





  Sodium Chloride  IVPB   100 mls





  DAILY PAULINO   Administration





     





     





     





     


 


Ondansetron HCl  4 mg  12/31/19 02:57  01/01/20 06:19





  Zofran Odt  PO   4 mg





  Q6H PRN   Administration





  Nausea/Vomiting   





     





     





     


 


Ondansetron HCl  4 mg  12/31/19 02:57  12/31/19 08:38





  Zofran  IVP   4 mg





  Q6H PRN   Administration





  Nausea/Vomiting   





     





     





     


 


Saccharomyces Boulardii  250 mg  12/31/19 09:00  01/05/20 09:07





  Florastor  PO   250 mg





  DAILY PAULINO   Administration





     





     





     





     


 


Sodium Chloride  10 ml  12/31/19 02:57  01/02/20 12:05





  Flush - Normal Saline  IVF   10 ml





  PRN PRN   Administration





  Saline Flush   





     





     





     


 


Tramadol HCl  100 mg  12/31/19 14:11  01/05/20 09:07





  Ultram  PO   100 mg





  Q6H PRN   Administration





  Moderate to Severe Pain (6-10)   





     





     





     














- Exam


General Appearance: NAD


Heart: RRR, no murmur


Respiratory: CTAB, no wheezes, no rales, no ronchi


Gastrointestinal: soft, normal bowel sounds


Extremities - other findings: Edema unchanged, erythema regression distal to 

knee, less ttp


Psychiatric: normal affect





Hosp A/P


(1) Acute on chronic respiratory failure with hypoxia and hypercapnia


Code(s): J96.21 - ACUTE AND CHRONIC RESPIRATORY FAILURE WITH HYPOXIA; J96.22 - 

ACUTE AND CHRONIC RESPIRATORY FAILURE WITH HYPERCAPNIA   Status: Resolved   





(2) Cellulitis of left thigh


Code(s): L03.116 - CELLULITIS OF LEFT LOWER LIMB   Status: Acute   





(3) Chronic respiratory failure with hypercapnia


Code(s): J96.12 - CHRONIC RESPIRATORY FAILURE WITH HYPERCAPNIA   Status: 

Chronic   





(4) Obesity hypoventilation syndrome


Code(s): E66.2 - MORBID (SEVERE) OBESITY WITH ALVEOLAR HYPOVENTILATION   Status

: Chronic   





(5) Sepsis


Code(s): A41.9 - SEPSIS, UNSPECIFIED ORGANISM   Status: Resolved   


Qualifiers: 


   Sepsis type: sepsis due to unspecified organism   Acute respiratory failure 

type: with hypercapnia 





- Plan





Appreciate Pulm consult (signed off) - recs on assisted ventilation for here 

and at home


Appreciate ID consult - Rocephin until clear improvement - pt with more 

noticeable improvement today, then keflex, then penicillin as prophylaxis





- consult to wound care to see if they care provide gentle compression to 

assist with the swelling - US requested and ordered yesterday -neg for DVT.


- continue elevation, pain meds prn


- wbc count increased today - will order for tomorrow to trend





continue furosemide to try and assist with edema - monitor renal function and 

potassium levels





dvt prophy - ambulatory and lovenox


gi prophy - not indicated


code status full





reviewed plan of care with patient, no questions or further needs at end of 

eval.





Of note - pt has an appt with PCP on Tuesday at 3 pm - [facility] to 

discuss his health and inability to work because of it.  if possible, he 

requests discharge to make this appt. I discussed with him re-evaluation 

tomorrow to see if meds can be changed to PO - Dr. Watson returns tomorrow - he 

last evaluated pt on Jan 2.

## 2020-01-06 VITALS — DIASTOLIC BLOOD PRESSURE: 85 MMHG | TEMPERATURE: 97.4 F | SYSTOLIC BLOOD PRESSURE: 145 MMHG

## 2020-01-06 LAB
ANION GAP SERPL CALC-SCNC: 12 MMOL/L (ref 10–20)
BUN SERPL-MCNC: 8 MG/DL (ref 8.9–20.6)
CALCIUM SERPL-MCNC: 8.6 MG/DL (ref 7.8–10.44)
CHLORIDE SERPL-SCNC: 99 MMOL/L (ref 98–107)
CO2 SERPL-SCNC: 29 MMOL/L (ref 22–29)
CREAT CL PREDICTED SERPL C-G-VRATE: 465 ML/MIN (ref 70–130)
GLUCOSE SERPL-MCNC: 94 MG/DL (ref 70–105)
HGB BLD-MCNC: 13.1 G/DL (ref 14–18)
MCH RBC QN AUTO: 25.6 PG (ref 27–31)
MCV RBC AUTO: 82.7 FL (ref 78–98)
MDIFF COMPLETE?: YES
PLATELET # BLD AUTO: 278 THOU/UL (ref 130–400)
POTASSIUM SERPL-SCNC: 4 MMOL/L (ref 3.5–5.1)
RBC # BLD AUTO: 5.13 MILL/UL (ref 4.7–6.1)
SODIUM SERPL-SCNC: 136 MMOL/L (ref 136–145)
WBC # BLD AUTO: 11.9 THOU/UL (ref 4.8–10.8)

## 2020-01-06 RX ADMIN — CEFTRIAXONE SCH MLS: 2 INJECTION, POWDER, FOR SOLUTION INTRAMUSCULAR; INTRAVENOUS at 08:11

## 2020-01-06 NOTE — PDOC.HOSPP
- Subjective


Encounter Date: 01/06/20


Encounter Time: 12:30


Subjective: 


Patient much better. Pain almost resolved. No more fever. No more redness or 

rash.





- Objective


Vital Signs & Weight: 


 Vital Signs (12 hours)











  Temp Pulse Resp BP Pulse Ox


 


 01/06/20 08:00      93 L


 


 01/06/20 07:24  97.3 F L  94  20  144/77 H  93 L


 


 01/05/20 21:59      93 L








 Weight











Admit Weight                   465 lb


 


Weight                         465 lb 8 oz











 Most Recent Monitor Data











Heart Rate from ECG            98


 


NIBP                           136/58


 


NIBP BP-Mean                   84


 


Respiration from ECG           23


 


SpO2                           98














Result Diagrams: 


 01/06/20 04:52





 01/06/20 04:52





Hospitalist ROS





- Review of Systems


Constitutional: denies: fever, chills


Respiratory: denies: cough, shortness of breath


Cardiovascular: denies: chest pain, palpitations


Gastrointestinal: denies: nausea, vomiting, abdominal pain





- Medication


Medications: 


Active Medications











Generic Name Dose Route Start Last Admin





  Trade Name Freq  PRN Reason Stop Dose Admin


 


Acetaminophen  650 mg  12/31/19 02:57  01/03/20 20:25





  Tylenol  PO   650 mg





  Q4H PRN   Administration





  Headache/Fever/Mild Pain (1-3)   





     





     





     


 


Enoxaparin Sodium  40 mg  12/31/19 09:00  01/06/20 08:11





  Lovenox  SC   40 mg





  0900 PAULINO   Administration





     





     





     





     


 


Famotidine  20 mg  12/31/19 09:00  01/05/20 20:44





  Pepcid  PO   20 mg





  BID PAULINO   Administration





     





     





     





     


 


Furosemide  40 mg  01/05/20 07:30  01/06/20 08:11





  Lasix  PO   40 mg





  DAILY-AC PAULINO   Administration





     





     





     





     


 


Gabapentin  300 mg  01/05/20 21:00  01/05/20 20:44





  Neurontin  PO   300 mg





  HS PAULINO   Administration





     





     





     





     


 


Ceftriaxone Sodium 2 gm/  100 mls @ 200 mls/hr  12/31/19 09:00  01/06/20 08:11





  Sodium Chloride  IVPB   100 mls





  DAILY PAULINO   Administration





     





     





     





     


 


Ondansetron HCl  4 mg  12/31/19 02:57  01/01/20 06:19





  Zofran Odt  PO   4 mg





  Q6H PRN   Administration





  Nausea/Vomiting   





     





     





     


 


Ondansetron HCl  4 mg  12/31/19 02:57  12/31/19 08:38





  Zofran  IVP   4 mg





  Q6H PRN   Administration





  Nausea/Vomiting   





     





     





     


 


Saccharomyces Boulardii  250 mg  12/31/19 09:00  01/06/20 08:11





  Florastor  PO   250 mg





  DAILY PAULINO   Administration





     





     





     





     


 


Sodium Chloride  10 ml  12/31/19 02:57  01/02/20 12:05





  Flush - Normal Saline  IVF   10 ml





  PRN PRN   Administration





  Saline Flush   





     





     





     


 


Tramadol HCl  100 mg  12/31/19 14:11  01/05/20 20:44





  Ultram  PO   100 mg





  Q6H PRN   Administration





  Moderate to Severe Pain (6-10)   





     





     





     














- Exam


General Appearance: NAD, awake alert


General - other findings: morbid obesity


Eye: anicteric sclera


ENT: moist mucosa


Heart: RRR, no murmur, no gallops, no rubs


Respiratory: CTAB, no wheezes, no rales, no ronchi


Gastrointestinal: soft, non-tender, non-distended, normal bowel sounds


Gastrointestinal - other findings: obese


Extremities: 1+ LE edema


Skin - other findings: redness/rash resolved completely, some blistering from 

edema right calf


Psychiatric: normal affect, normal behavior, A&O x 3





Hosp A/P


(1) Left leg cellulitis


Code(s): L03.116 - CELLULITIS OF LEFT LOWER LIMB   Status: Acute   





(2) Sepsis


Code(s): A41.9 - SEPSIS, UNSPECIFIED ORGANISM   Status: Resolved   


Qualifiers: 


   Sepsis type: sepsis due to unspecified organism   Acute respiratory failure 

type: with hypercapnia 





(3) Lactic acidosis


Code(s): E87.2 - ACIDOSIS   Status: Resolved   





(4) Morbid obesity


Code(s): E66.01 - MORBID (SEVERE) OBESITY DUE TO EXCESS CALORIES   Status: 

Chronic   





(5) Sleep apnea


Code(s): G47.30 - SLEEP APNEA, UNSPECIFIED   Status: Chronic   





(6) Acute on chronic respiratory failure with hypoxia and hypercapnia


Code(s): J96.21 - ACUTE AND CHRONIC RESPIRATORY FAILURE WITH HYPOXIA; J96.22 - 

ACUTE AND CHRONIC RESPIRATORY FAILURE WITH HYPERCAPNIA   Status: Resolved   





- Plan


Patient on Rocephin since 12/31/2019


ID consult, Pulm consult- now signed off


BiPAP at night


Rash resolved, discussed with Dr. Parry, will change to Keflex 500mg QID for 10 

days


then BID Pen VK 500mg as outpatient for suppressive therapy x 1 year


D/c home

## 2020-01-07 NOTE — DIS
DATE OF ADMISSION:  12/30/2019



DATE OF DISCHARGE:  01/06/2020



PRIMARY CARE PHYSICIAN:  Now going to be Dr. Dex Butcher.



REASON FOR ADMISSION:  Cellulitis and sepsis.



DIAGNOSES AT DISCHARGE:  

1. Left leg cellulitis, resolved.

2. Severe sepsis, resolved.

3. Lactic acidosis, resolved.

4. Morbid obesity.

5. Obstructive sleep apnea.

6. Acute on chronic respiratory failure with hypoxia and hypercapnia, improved.

7. Obesity hypoventilation syndrome.



PROCEDURES:  

1. Soft tissue ultrasound showing no evidence of subcutaneous fluid in the left

lower extremity. 

2. Vascular ultrasound of the left lower extremity showed no evidence of DVT.

3. Repeat vascular ultrasound of the left lower extremity again showing no evidence

of DVT. 



CONSULTATIONS:  

1. Pulmonology, Dr. Urrutia.

2. Infectious Disease, Dr. Parry.



SUMMARY OF HOSPITAL COURSE:  This is a 29-year-old  male with morbid obesity

and obesity hypoventilation syndrome who presented with generalized weakness to the

emergency room.  He had previously been admitted with sepsis and left lower

extremity cellulitis in September.  He was discharged on Keflex followed by

penicillin VK.  Patient was noted when he resented to have recurrence with severe

sepsis.  He was put on IV antibiotics.  He had to be given fluids and eventually he

became somewhat somnolent and with some drops in his oxygen, so had to be moved to

the [facility] and put on BiPAP.  Patient improved markedly during the hospitalization.

Dr. Urrutia managed acute exacerbation of his chronic respiratory failure.  He was

able to be weaned off BiPAP and oxygen.  He does continue to need ventilatory

support at night and has a home CPAP set up.  Patient had significant pain and

redness going all the way up to his left thigh on admission.  This improved with

antibiotics and eventually the redness resolved.  Pain improved.  He was up

ambulating without difficulty on the day of discharge.  I did discuss this case with

Dr. Parry.  Dr. Parry believes that the previous failure was due to insufficient

dose of penicillin VK for suppression given his obesity.  He is being discharged on

a 10-day Keflex course followed by penicillin  mg twice a day for a full year.

 Patient is now being set up with new primary care physician now that he has

insurance and will see Dr. Dex Butcher on discharge. 



DISCHARGE MANAGEMENT:  Discharged home.  Follow up with Dr. Dex Butcher in 1 to 2

weeks, with Dr. Brading in 2 months to review his ventilator read out. 



ACTIVITY:  As tolerated.



DIET:  Bariatric diet.



DISCHARGE MEDICATIONS:  

1. Keflex 500 mg 4 times a day, 40 capsules dispensed.

2. Penicillin  mg twice a day, 1-month supply dispensed.  Patient is to get

refills on this from his primary care physician. 

3. Gabapentin 300 mg at night.



TIME SPENT:  Arranging the details of this discharge took 32 minutes.







Job ID:  [id number]

## 2020-05-21 ENCOUNTER — HOSPITAL ENCOUNTER (INPATIENT)
Dept: HOSPITAL 92 - ERS | Age: 30
LOS: 2 days | Discharge: HOME | DRG: 872 | End: 2020-05-23
Attending: INTERNAL MEDICINE | Admitting: INTERNAL MEDICINE
Payer: SELF-PAY

## 2020-05-21 VITALS — BODY MASS INDEX: 78 KG/M2

## 2020-05-21 DIAGNOSIS — I87.2: ICD-10-CM

## 2020-05-21 DIAGNOSIS — E66.01: ICD-10-CM

## 2020-05-21 DIAGNOSIS — L03.039: ICD-10-CM

## 2020-05-21 DIAGNOSIS — L03.116: ICD-10-CM

## 2020-05-21 DIAGNOSIS — B95.5: ICD-10-CM

## 2020-05-21 DIAGNOSIS — E87.1: ICD-10-CM

## 2020-05-21 DIAGNOSIS — G47.30: ICD-10-CM

## 2020-05-21 DIAGNOSIS — A41.9: Primary | ICD-10-CM

## 2020-05-21 LAB
ALBUMIN SERPL BCG-MCNC: 3.7 G/DL (ref 3.5–5)
ALP SERPL-CCNC: 81 U/L (ref 40–110)
ALT SERPL W P-5'-P-CCNC: 21 U/L (ref 8–55)
ANION GAP SERPL CALC-SCNC: 11 MMOL/L (ref 10–20)
AST SERPL-CCNC: 19 U/L (ref 5–34)
BILIRUB SERPL-MCNC: 0.9 MG/DL (ref 0.2–1.2)
BUN SERPL-MCNC: 9 MG/DL (ref 8.9–20.6)
CALCIUM SERPL-MCNC: 8.3 MG/DL (ref 7.8–10.44)
CHLORIDE SERPL-SCNC: 100 MMOL/L (ref 98–107)
CO2 SERPL-SCNC: 27 MMOL/L (ref 22–29)
CREAT CL PREDICTED SERPL C-G-VRATE: 0 ML/MIN (ref 70–130)
GLOBULIN SER CALC-MCNC: 4.1 G/DL (ref 2.4–3.5)
GLUCOSE SERPL-MCNC: 104 MG/DL (ref 70–105)
HGB BLD-MCNC: 14.9 G/DL (ref 14–18)
MCH RBC QN AUTO: 26.7 PG (ref 27–31)
MCV RBC AUTO: 87.8 FL (ref 78–98)
MDIFF COMPLETE?: YES
PLATELET # BLD AUTO: 191 THOU/UL (ref 130–400)
POLYCHROMASIA BLD QL SMEAR: (no result) (100X)
POTASSIUM SERPL-SCNC: 4.5 MMOL/L (ref 3.5–5.1)
RBC # BLD AUTO: 5.57 MILL/UL (ref 4.7–6.1)
SODIUM SERPL-SCNC: 133 MMOL/L (ref 136–145)
WBC # BLD AUTO: 29.6 THOU/UL (ref 4.8–10.8)

## 2020-05-21 PROCEDURE — 87102 FUNGUS ISOLATION CULTURE: CPT

## 2020-05-21 PROCEDURE — 80061 LIPID PANEL: CPT

## 2020-05-21 PROCEDURE — 83605 ASSAY OF LACTIC ACID: CPT

## 2020-05-21 PROCEDURE — 36415 COLL VENOUS BLD VENIPUNCTURE: CPT

## 2020-05-21 PROCEDURE — 96367 TX/PROPH/DG ADDL SEQ IV INF: CPT

## 2020-05-21 PROCEDURE — 96365 THER/PROPH/DIAG IV INF INIT: CPT

## 2020-05-21 PROCEDURE — 87205 SMEAR GRAM STAIN: CPT

## 2020-05-21 PROCEDURE — 87186 SC STD MICRODIL/AGAR DIL: CPT

## 2020-05-21 PROCEDURE — 80053 COMPREHEN METABOLIC PANEL: CPT

## 2020-05-21 PROCEDURE — 87040 BLOOD CULTURE FOR BACTERIA: CPT

## 2020-05-21 PROCEDURE — 83036 HEMOGLOBIN GLYCOSYLATED A1C: CPT

## 2020-05-21 PROCEDURE — 80048 BASIC METABOLIC PNL TOTAL CA: CPT

## 2020-05-21 PROCEDURE — 87077 CULTURE AEROBIC IDENTIFY: CPT

## 2020-05-21 PROCEDURE — 87070 CULTURE OTHR SPECIMN AEROBIC: CPT

## 2020-05-21 PROCEDURE — 85025 COMPLETE CBC W/AUTO DIFF WBC: CPT

## 2020-05-21 PROCEDURE — 87206 SMEAR FLUORESCENT/ACID STAI: CPT

## 2020-05-21 RX ADMIN — CEFTRIAXONE SCH MLS: 2 INJECTION, POWDER, FOR SOLUTION INTRAMUSCULAR; INTRAVENOUS at 19:55

## 2020-05-21 NOTE — HP
HISTORY OF PRESENT ILLNESS:  Mr. Field is a 30-year-old male with medical 
history

of recurrent left thigh cellulitis (on chronic antibiotics, Keflex and 
penicillin),

morbid obesity, sleep apnea, lymphedema, and venous insufficiency, who presents 
for

left posterior thigh redness, warmth, and pain.  The patient has been taking 
Keflex

and penicillin as per recommendations.  On the day prior to presentation, he

developed redness and pain of his posterior thigh extending to the inner part 
of the

thigh.  Considering his previous episodes of cellulitis, he decided to present 
to

the ED. 



On encounter, lying comfortably in bed and has no complaints.  Denies fever, 
chills,

night sweats, lower extremity injury or lesions, headache, chest pain, 
palpitations,

pleuritic pain, cough, sputum production, dysuria, burning on urination, urinary

frequency. 



ED COURSE:  In the ED, the patient was found to be tachypneic, febrile at 100.4 
and

tachycardic.  He was started on fluids and vancomycin and improved within 2 
hours,

then he was admitted to the medical floor. 



REVIEW OF SYSTEMS:  Complete review of systems was carried out and was negative 
with

the exception of that mentioned in the HPI. 



PAST MEDICAL HISTORY:  Morbid obesity, sleep apnea (not using CPAP), lymphedema,

venous insufficiency of the lower extremities, recurrent left thigh cellulitis. 



PAST SURGICAL HISTORY:  No surgical history.



FAMILY HISTORY:  No family history.



SOCIAL HISTORY:  Does not smoke, drink, or do recreational drugs.



MEDICATIONS:  Keflex and penicillin.



ALLERGIES:  NO KNOWN DRUG ALLERGIES.



PHYSICAL EXAMINATION:

VITAL SIGNS:  Blood pressure 93/54, pulse 111, respiratory rate 20, oxygen

saturation 94% on 2 L nasal cannula, temperature 98.5 Fahrenheit. 

GENERAL APPEARANCE:  No apparent distress.  Morbidly obese. 

HEENT:  No oropharyngeal erythema or exudates.  No postauricular, submandibular,

cervical lymphadenopathy. 

CARDIAC:  Regular rhythm, fast heart rate, tachycardic at around 100 to 110, no

murmurs, rubs, or gallops. 

LUNGS:  Diffusely reduced breath sounds, likely due to body habitus, no wheezing
,

rales, or rhonchi. 

ABDOMEN:  Nontender, nondistended.  Normal bowel sounds.  No guarding. 

EXTREMITIES:  Left posterior and inner thigh erythema and warmth with no 
induration

or fluctuation appreciated. 

PSYCHIATRIC:  Normal mood and affect.  Alert and oriented x3.



LABS AND IMAGING:  Labs were remarkable for a white count of 29.6 with bandemia 
and

mild hyponatremia. 



ASSESSMENT AND PLAN:  Mr. Field is a 30-year-old male with a medical history of

recurrent left thigh cellulitis, who presents with another episode of 
cellulitis. 

1. Left thigh cellulitis. 

Considering no fluctuation, abscess formation, most likely etiology is 
Streptococcus

cellulitis.  We will discontinue vancomycin and start the patient on 
ceftriaxone IV. 

The patient is currently borderline hypotensive.  Start 1 L bolus, then 
maintenance

fluids at 125 mL/h. 

Consider recurrent infections and no apparent mechanism.  The patient appears to

have bilateral nail infection.  Sent nail clip for bacterial and fungal growth. 

1. Morbid obesity. 

Considering the patient's BMI of above 40, we will benefit from consultation 
with

bariatric surgery and dietitian as an outpatient. 

Obtain A1c and lipid profile.

1. Disposition:  Prophylaxis.

2. Full code.

3. Deep venous thrombosis prophylaxis, Lovenox.

4. Gastrointestinal prophylaxis.  No indication.







Job ID:  [id number]



MTDD

## 2020-05-22 LAB
ANION GAP SERPL CALC-SCNC: 9 MMOL/L (ref 10–20)
BASOPHILS # BLD AUTO: 0.1 THOU/UL (ref 0–0.2)
BASOPHILS NFR BLD AUTO: 0.3 % (ref 0–1)
BUN SERPL-MCNC: 7 MG/DL (ref 8.9–20.6)
CALCIUM SERPL-MCNC: 7.9 MG/DL (ref 7.8–10.44)
CHD RISK SERPL-RTO: 2.9 (ref ?–4.5)
CHLORIDE SERPL-SCNC: 103 MMOL/L (ref 98–107)
CHOLEST SERPL-MCNC: 129 MG/DL
CO2 SERPL-SCNC: 28 MMOL/L (ref 22–29)
CREAT CL PREDICTED SERPL C-G-VRATE: 478 ML/MIN (ref 70–130)
EOSINOPHIL # BLD AUTO: 0.1 THOU/UL (ref 0–0.7)
EOSINOPHIL NFR BLD AUTO: 0.6 % (ref 0–10)
GLUCOSE SERPL-MCNC: 124 MG/DL (ref 70–105)
HDLC SERPL-MCNC: 45 MG/DL
HGB BLD-MCNC: 13.7 G/DL (ref 14–18)
LDLC SERPL CALC-MCNC: 71 MG/DL
LYMPHOCYTES # BLD: 1.5 THOU/UL (ref 1.2–3.4)
LYMPHOCYTES NFR BLD AUTO: 8.4 % (ref 21–51)
MCH RBC QN AUTO: 27.1 PG (ref 27–31)
MCV RBC AUTO: 88.9 FL (ref 78–98)
MONOCYTES # BLD AUTO: 1.7 THOU/UL (ref 0.11–0.59)
MONOCYTES NFR BLD AUTO: 9.5 % (ref 0–10)
NEUTROPHILS # BLD AUTO: 14.1 THOU/UL (ref 1.4–6.5)
NEUTROPHILS NFR BLD AUTO: 81.1 % (ref 42–75)
PLATELET # BLD AUTO: 168 THOU/UL (ref 130–400)
POTASSIUM SERPL-SCNC: 4 MMOL/L (ref 3.5–5.1)
RBC # BLD AUTO: 5.04 MILL/UL (ref 4.7–6.1)
SODIUM SERPL-SCNC: 136 MMOL/L (ref 136–145)
TRIGL SERPL-MCNC: 64 MG/DL (ref ?–150)
WBC # BLD AUTO: 17.4 THOU/UL (ref 4.8–10.8)

## 2020-05-22 RX ADMIN — DOCUSATE SODIUM 50 MG AND SENNOSIDES 8.6 MG SCH TAB: 8.6; 5 TABLET, FILM COATED ORAL at 20:31

## 2020-05-22 RX ADMIN — CEFTRIAXONE SCH MLS: 2 INJECTION, POWDER, FOR SOLUTION INTRAMUSCULAR; INTRAVENOUS at 20:31

## 2020-05-22 NOTE — PDOC.HOSPP
- Subjective


Encounter Date: 05/22/20


Encounter Time: 08:00


Subjective: 





overnight, had bloody bowel movement when straining on defecation. Has had it 

before and endorses it happens only when has hard stools. Endorses improvemnt 

in left thigh pain and redness. 





- Objective


Vital Signs & Weight: 


 Vital Signs (12 hours)











  Temp Pulse Resp BP Pulse Ox


 


 05/22/20 11:11  97.6 F  85  22 H  131/76  93 L


 


 05/22/20 08:00      93 L


 


 05/22/20 07:21  98.7 F  96  20  112/62  93 L


 


 05/22/20 05:10  97.9 F  103 H  20  122/55 L  92 L








 Weight











Weight                         468 lb 14.771 oz














I&O: 


 











 05/21/20 05/22/20 05/23/20





 06:59 06:59 06:59


 


Intake Total  1240 800


 


Balance  1240 800











Result Diagrams: 


 05/22/20 06:02





 05/22/20 08:09





Hospitalist ROS





- Review of Systems


Constitutional: denies: fever, chills, sweats, weakness, malaise, other


Respiratory: denies: cough, dry, shortness of breath, hemoptysis, SOB with 

excertion, pleuritic pain, sputum, wheezing, other


Cardiovascular: denies: chest pain, palpitations, orthopnea, paroxysmal noc. 

dyspnea, edema, light headedness, other


Gastrointestinal: denies: nausea, vomiting, abdominal pain, diarrhea, 

constipation, melena, hematochezia, other





- Medication


Medications: 


Active Medications











Generic Name Dose Route Start Last Admin





  Trade Name Freq  PRN Reason Stop Dose Admin


 


Enoxaparin Sodium  40 mg  05/22/20 09:00  05/22/20 09:17





  Lovenox  SC   40 mg





  0900 PAULINO   Administration





     





     





     





     


 


Sodium Chloride  1,000 mls @ 125 mls/hr  05/21/20 19:45  05/22/20 14:48





  Normal Saline 0.9%  IV   1,000 mls





  .Q8H PAULINO   Administration





     





     





     





     


 


Ceftriaxone Sodium 2 gm/  100 mls @ 200 mls/hr  05/21/20 20:00  05/21/20 19:55





  Sodium Chloride  IVPB   100 mls





  2000 PAULINO   Administration





     





     





     





     


 


Tramadol HCl  50 mg  05/21/20 19:01  05/21/20 23:57





  Ultram  PO   50 mg





  Q6H PRN   Administration





  Moderate to Severe Pain (6-10)   





     





     





     














- Exam


General Appearance: NAD, awake alert


Heart: RRR, no murmur, no gallops, no rubs, normal peripheral pulses


Respiratory: no wheezes, no rales, no ronchi


Respiratory - other findings: diffusely reduced breath sounds, unchanged


Gastrointestinal: soft, non-tender, non-distended, normal bowel sounds, no 

palpable masses, no hepatomegaly, no splenomegaly, no bruit


Extremities: no edema


Skin - other findings: left posterior and lateral thigh redness, no tenderness (

improved)


Psychiatric: normal affect, normal behavior, A&O x 3





Hosp A/P





- Plan


#left thigh cellulitis


-improving on ceftriaxone


-considering failed on outpatient treatment, will keep for another night; 


-pending nail culture considering possible source for recurrent soft tissue 

infection; will make outpatient appt with podiatry





#hard stools


-started senna/doc

## 2020-05-23 VITALS — TEMPERATURE: 98.6 F | SYSTOLIC BLOOD PRESSURE: 150 MMHG | DIASTOLIC BLOOD PRESSURE: 98 MMHG

## 2020-05-23 LAB
ANION GAP SERPL CALC-SCNC: 12 MMOL/L (ref 10–20)
BASOPHILS # BLD AUTO: 0.1 THOU/UL (ref 0–0.2)
BASOPHILS NFR BLD AUTO: 0.5 % (ref 0–1)
BUN SERPL-MCNC: 7 MG/DL (ref 8.9–20.6)
CALCIUM SERPL-MCNC: 8.1 MG/DL (ref 7.8–10.44)
CHLORIDE SERPL-SCNC: 103 MMOL/L (ref 98–107)
CO2 SERPL-SCNC: 25 MMOL/L (ref 22–29)
CREAT CL PREDICTED SERPL C-G-VRATE: 485 ML/MIN (ref 70–130)
EOSINOPHIL # BLD AUTO: 0.3 THOU/UL (ref 0–0.7)
EOSINOPHIL NFR BLD AUTO: 2.6 % (ref 0–10)
GLUCOSE SERPL-MCNC: 118 MG/DL (ref 70–105)
HGB BLD-MCNC: 13.6 G/DL (ref 14–18)
LYMPHOCYTES # BLD: 2 THOU/UL (ref 1.2–3.4)
LYMPHOCYTES NFR BLD AUTO: 18 % (ref 21–51)
MCH RBC QN AUTO: 26.6 PG (ref 27–31)
MCV RBC AUTO: 89.2 FL (ref 78–98)
MONOCYTES # BLD AUTO: 1.4 THOU/UL (ref 0.11–0.59)
MONOCYTES NFR BLD AUTO: 12.7 % (ref 0–10)
NEUTROPHILS # BLD AUTO: 7.3 THOU/UL (ref 1.4–6.5)
NEUTROPHILS NFR BLD AUTO: 66.3 % (ref 42–75)
PLATELET # BLD AUTO: 173 THOU/UL (ref 130–400)
POTASSIUM SERPL-SCNC: 4 MMOL/L (ref 3.5–5.1)
RBC # BLD AUTO: 5.11 MILL/UL (ref 4.7–6.1)
SODIUM SERPL-SCNC: 136 MMOL/L (ref 136–145)
WBC # BLD AUTO: 11 THOU/UL (ref 4.8–10.8)

## 2020-05-23 RX ADMIN — DOCUSATE SODIUM 50 MG AND SENNOSIDES 8.6 MG SCH TAB: 8.6; 5 TABLET, FILM COATED ORAL at 08:04

## 2020-05-23 NOTE — CON
DATE OF CONSULTATION:  



REASON FOR CONSULTATION:  Recurrence of cellulitis, left thigh.



HISTORY OF PRESENT ILLNESS:  Well-known patient to me with a history of recurrent

cellulitis, lymphedema of lower extremities, and obesity, who has recurrent episodes

of cellulitis of left thigh.  I have treated him twice, the last time was 09/2019

when he presented with a history of intertriginous maceration in the groin with

lymphedema with cellulitis.  He was treated and released on suppressive therapy.  He

was also given a supply of Keflex to be available upon the earliest sign of

inflammatory change, so this time he did that and he seems to be compliant with

medication.  Apparently, the only change was that he worked a [employer] for a

whole day, immediately before this current exacerbation, anyway so he was admitted.

He was placed on ceftriaxone with rapid reversal.  He has not had a fever here

except for very mild one.  Right now feeling better.  The thigh has much improved.

No headaches, sore throat, odynophagia, or dysphagia.  No respiratory symptoms or

abdominal pain.  No genitourinary symptoms.  No joint symptoms. 



MEDICAL HISTORY:  

1. Obesity.

2. Cellulitis.

3. Stasis dermatitis.

4. Lymphedema of lower extremities.

5. Venous insufficiency.



SOCIAL HISTORY:  Smokes intermittently.  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

irrigation and landscaping, and most of the time he does not work.  He is ,

actually in a common-law relationship with a second woman other than his previous

wife.  He does not drink or use drugs. 



FAMILY HISTORY:  Noncontributory.



ALLERGIES:  NONE.



CURRENT MEDICATIONS:  

1. P.r.n. medications.

2. Rocephin.



PHYSICAL EXAMINATION:

VITAL SIGNS:  Normal.  O2 sats 93 to 97. 

SKIN:  The area of erythema extending towards the medial thigh just like the

previous episodes exactly.  This is a similar presentation in the left thigh.

Peripheral IV access.  No lymphadenopathy. 

HEENT:  Normal. 

LUNGS:  Clear. 

HEART:  S1 and S2.  Regular rate. 

ABDOMEN:  Soft.  There is quite prominent panniculus. 

EXTREMITIES:  The legs are spared.  The pulses are 1+ in dorsalis pedis.



LABORATORY DATA:  The white cell count was 29.6 and now is 11, hemoglobin 13, and

platelets 173. 



ASSESSMENT:  

1. Recurrent cellulitis.

2. Obesity.

3. Lymphedema. 

At this time, it appears that the data he spent working in [employer] was a

precipitating factor.  He did have some Keflex supplies, which probably led to a

quicker resolution of the process as opposed to the previous episode, so I think he

will be able to be discharged soon at this time on Keflex.  I would probably

increase the dose in view of his weight to a gram q.6 h. for 7 days and then

transition to suppressive dose with 500 b.i.d. of Keflex for a protracted period of

time.  Could also use penicillin VK a gram q.6 and then transition to 500 b.i.d. for

suppressive intent after the completion of the active treatment phase. 







Job ID:  [id number]

## 2020-05-23 NOTE — DIS
DATE OF ADMISSION:  05/21/2020



DATE OF DISCHARGE:  05/23/2020



PRIMARY CARE PROVIDER:  Dr. Dex Butcher.



DISCHARGE DIAGNOSES:  

1. Recurrent cellulitis.

2. Toenail infection.

3. Morbid obesity, chronic.

4. Hyponatremia.



CONDITION OF PATIENT ON THE DAY OF DISCHARGE:  Stable.  I assessed Mr. Emir delaney

on the day of discharge.  He denies any chest pain or shortness of breath.  Vital

signs are stable.  S1 and S2 are heard, regular.  Lungs are clear to auscultation

bilaterally. 



DISCHARGE MEDICATIONS:  

1. Cephalexin 1000 mg 4 times daily for 10 days. 

2. Penicillin  mg 2 times daily for 12 months.



HOSPITAL COURSE:  Mr. Emir Delaney is a pleasant 30-year-old gentleman, who was

admitted to [facility] on May 21, 2020 for a recurrent thigh

cellulitis.  He was treated with IV antibiotics with improvement in the cellulitis.

Because this is representing recurrent cellulitis in the context of chronic

suppressive therapy, Infectious Diseases Service, Dr. Parry was consulted.  He

recommended Keflex 1000 mg 4 times daily for 10 days followed by penicillin 

mg 2 times a day for 12 months. 



Many thanks for allowing me to participate in your patient's care.  Please feel free

to contact me with any questions or concerns. 



During this hospitalization, toenail clippings were sent for culture.  Preliminary

blood cultures are negative at the time of discharge.  The patient is advised to

follow up with primary care provider for final blood culture result as well as the

toenail culture. 



ACTIVITY:  No restrictions. 



Post-acute care followup:  With primary care provider in 3 days and with a

podiatrist. 



DIET:  Heart healthy.



DISCHARGE DESTINATION:  Home.



TIME SPENT:  Total amount of time spent coordinating this discharge:  32 minutes.







Job ID:  [id number]

## 2020-05-26 NOTE — PQF
FABIAN Gerber Jr

[id number]                                                             

[id number]                             

                                   

CLINICAL DOCUMENTATION CLARIFICATION FORM:  POST DISCHARGE



Addendum to original discharge summary date:  __________________________________
____



Late entry note date:  _________________________________________________________
__











DATE:  05/26/2020                                                  ATTN:Fabian Lim







Please exercise your independent, professional judgment in responding to the 
clarification form. 

Clinical indicators are provided on the bottom of this form for your review





Please check appropriate box(s) to clarify if the following diagnosis has been 
ruled in or ruled out:



Sepsis

[x  ] Ruled in diagnosis

     [  ] Continue to treat        [ x ] Resolved

[  ] Ruled out diagnosis

[  ] Cannot rule out diagnosis

[  ] Other diagnosis ___________

[  ] Unable to determine





For continuity of documentation, please document condition throughout progress 
notes and discharge summary.  Thank You.



CLINICAL INDICATORS - SIGNS / SYMPTOMS / LABS

ED Notes 05/21 "sepsis"

ED Notes 05/21 "recurrent cellulitis"

ED Notes 05/21 "presents with redness and pain on the inner thigh"

 05/21 "cellulitis most likely etiology streptococcus"

Vital Signs 05/21: temp=99.6 Pulse=111 Respi=22 YM=042/55

Collected 05/21 "Blood culture:no growth"

Labs WBC: 05/21=29.6 05/22=17.4 05/23=11.0

Labs Lactate: 05/21=1.8



RISK FACTORS

Morbid obesity-HP 05/21

Venous insufficiency-HP 05/21

Left thigh cellulitis- 05/21



TREATMENTS

IVF-HP 05/21

Clindamycin 600mg IV-MAR 05/21

Vancomycin 1gm IV-MAR 05/21

Rocephin 2gm IV-MAR 05/21

Blood and wound culture-Collected 05/21







(This form is maintained as a part of the permanent medical record)

2015 [employer].  All Rights Reserved

[email or web address]    [phone]
[phone]

                                                              



 

TEAGAN

## 2022-08-12 ENCOUNTER — HOSPITAL ENCOUNTER (INPATIENT)
Dept: HOSPITAL 92 - ERS | Age: 32
LOS: 3 days | Discharge: HOME | DRG: 871 | End: 2022-08-15
Attending: INTERNAL MEDICINE | Admitting: INTERNAL MEDICINE
Payer: COMMERCIAL

## 2022-08-12 VITALS — BODY MASS INDEX: 78.5 KG/M2

## 2022-08-12 DIAGNOSIS — Z91.14: ICD-10-CM

## 2022-08-12 DIAGNOSIS — Z83.3: ICD-10-CM

## 2022-08-12 DIAGNOSIS — J96.22: ICD-10-CM

## 2022-08-12 DIAGNOSIS — G93.41: ICD-10-CM

## 2022-08-12 DIAGNOSIS — J96.21: ICD-10-CM

## 2022-08-12 DIAGNOSIS — L03.115: ICD-10-CM

## 2022-08-12 DIAGNOSIS — F17.210: ICD-10-CM

## 2022-08-12 DIAGNOSIS — M54.9: ICD-10-CM

## 2022-08-12 DIAGNOSIS — Z20.822: ICD-10-CM

## 2022-08-12 DIAGNOSIS — A41.9: Primary | ICD-10-CM

## 2022-08-12 DIAGNOSIS — G89.29: ICD-10-CM

## 2022-08-12 DIAGNOSIS — E66.2: ICD-10-CM

## 2022-08-12 LAB
ALBUMIN SERPL BCG-MCNC: 3.6 G/DL (ref 3.5–5)
ALP SERPL-CCNC: 72 U/L (ref 40–110)
ALT SERPL W P-5'-P-CCNC: 16 U/L (ref 8–55)
ANALYZER IN CARDIO: (no result)
ANION GAP SERPL CALC-SCNC: 13 MMOL/L (ref 10–20)
APAP SERPL-MCNC: (no result) MCG/ML (ref 10–30)
AST SERPL-CCNC: 18 U/L (ref 5–34)
BASE EXCESS STD BLDA CALC-SCNC: 3.8 MEQ/L
BILIRUB SERPL-MCNC: 2 MG/DL (ref 0.2–1.2)
BUN SERPL-MCNC: 9 MG/DL (ref 8.9–20.6)
CA-I BLDA-SCNC: 1.06 MMOL/L (ref 1.12–1.3)
CALCIUM SERPL-MCNC: 8.1 MG/DL (ref 7.8–10.44)
CHLORIDE SERPL-SCNC: 95 MMOL/L (ref 98–107)
CK SERPL-CCNC: 193 U/L (ref 30–200)
CO2 SERPL-SCNC: 29 MMOL/L (ref 22–29)
CREAT CL PREDICTED SERPL C-G-VRATE: 0 ML/MIN (ref 70–130)
DRUG SCREEN CUTOFF: (no result)
GLOBULIN SER CALC-MCNC: 3.7 G/DL (ref 2.4–3.5)
GLUCOSE SERPL-MCNC: 90 MG/DL (ref 70–105)
HCO3 BLDA-SCNC: 29.9 MEQ/L (ref 22–28)
HCT VFR BLDA CALC: 41 % (ref 42–52)
HGB BLD-MCNC: 13.8 G/DL (ref 14–18)
HGB BLDA-MCNC: 13.8 G/DL (ref 14–18)
LIPASE SERPL-CCNC: 7 U/L (ref 8–78)
MCH RBC QN AUTO: 27.6 PG (ref 27–31)
MCV RBC AUTO: 86.5 FL (ref 78–98)
MDIFF COMPLETE?: YES
O2 A-A PPRESDIFF RESPIRATORY: 26.45 MMHG (ref 0–20)
PCO2 BLDA: 51.1 MMHG (ref 35–45)
PH BLDA: 7.39 [PH] (ref 7.35–7.45)
PLATELET # BLD AUTO: 154 THOU/UL (ref 130–400)
PO2 BLDA: 59.4 MMHG (ref 80–100)
POTASSIUM BLD-SCNC: 3.55 MMOL/L (ref 3.7–5.3)
POTASSIUM SERPL-SCNC: 3.7 MMOL/L (ref 3.5–5.1)
PROT UR STRIP.AUTO-MCNC: 30 MG/DL
RBC # BLD AUTO: 5 MILL/UL (ref 4.7–6.1)
SALICYLATES SERPL-MCNC: (no result) MG/DL (ref 15–30)
SODIUM SERPL-SCNC: 133 MMOL/L (ref 136–145)
SP GR UR STRIP: 1.02 (ref 1–1.04)
SPECIMEN DRAWN FROM PATIENT: (no result)
WBC # BLD AUTO: 37 THOU/UL (ref 4.8–10.8)
WBC UR QL AUTO: (no result) HPF (ref 0–3)

## 2022-08-12 PROCEDURE — 36415 COLL VENOUS BLD VENIPUNCTURE: CPT

## 2022-08-12 PROCEDURE — 82140 ASSAY OF AMMONIA: CPT

## 2022-08-12 PROCEDURE — 96367 TX/PROPH/DG ADDL SEQ IV INF: CPT

## 2022-08-12 PROCEDURE — 81015 MICROSCOPIC EXAM OF URINE: CPT

## 2022-08-12 PROCEDURE — 87040 BLOOD CULTURE FOR BACTERIA: CPT

## 2022-08-12 PROCEDURE — 80048 BASIC METABOLIC PNL TOTAL CA: CPT

## 2022-08-12 PROCEDURE — 83690 ASSAY OF LIPASE: CPT

## 2022-08-12 PROCEDURE — 80307 DRUG TEST PRSMV CHEM ANLYZR: CPT

## 2022-08-12 PROCEDURE — 82550 ASSAY OF CK (CPK): CPT

## 2022-08-12 PROCEDURE — 96375 TX/PRO/DX INJ NEW DRUG ADDON: CPT

## 2022-08-12 PROCEDURE — 96365 THER/PROPH/DIAG IV INF INIT: CPT

## 2022-08-12 PROCEDURE — 87086 URINE CULTURE/COLONY COUNT: CPT

## 2022-08-12 PROCEDURE — 36600 WITHDRAWAL OF ARTERIAL BLOOD: CPT

## 2022-08-12 PROCEDURE — 81003 URINALYSIS AUTO W/O SCOPE: CPT

## 2022-08-12 PROCEDURE — 80202 ASSAY OF VANCOMYCIN: CPT

## 2022-08-12 PROCEDURE — 85025 COMPLETE CBC W/AUTO DIFF WBC: CPT

## 2022-08-12 PROCEDURE — 80306 DRUG TEST PRSMV INSTRMNT: CPT

## 2022-08-12 PROCEDURE — 93970 EXTREMITY STUDY: CPT

## 2022-08-12 PROCEDURE — 82805 BLOOD GASES W/O2 SATURATION: CPT

## 2022-08-12 PROCEDURE — 84484 ASSAY OF TROPONIN QUANT: CPT

## 2022-08-12 PROCEDURE — 71045 X-RAY EXAM CHEST 1 VIEW: CPT

## 2022-08-12 PROCEDURE — 80053 COMPREHEN METABOLIC PANEL: CPT

## 2022-08-12 PROCEDURE — 83605 ASSAY OF LACTIC ACID: CPT

## 2022-08-12 PROCEDURE — U0002 COVID-19 LAB TEST NON-CDC: HCPCS

## 2022-08-12 PROCEDURE — 93005 ELECTROCARDIOGRAM TRACING: CPT

## 2022-08-13 LAB
ANION GAP SERPL CALC-SCNC: 14 MMOL/L (ref 10–20)
BUN SERPL-MCNC: 8 MG/DL (ref 8.9–20.6)
CALCIUM SERPL-MCNC: 7.9 MG/DL (ref 7.8–10.44)
CHLORIDE SERPL-SCNC: 98 MMOL/L (ref 98–107)
CO2 SERPL-SCNC: 25 MMOL/L (ref 22–29)
CREAT CL PREDICTED SERPL C-G-VRATE: 443 ML/MIN (ref 70–130)
GLUCOSE SERPL-MCNC: 101 MG/DL (ref 70–105)
HGB BLD-MCNC: 12.7 G/DL (ref 14–18)
MCH RBC QN AUTO: 27.3 PG (ref 27–31)
MCV RBC AUTO: 86.8 FL (ref 78–98)
MDIFF COMPLETE?: YES
PLATELET # BLD AUTO: 140 THOU/UL (ref 130–400)
POTASSIUM SERPL-SCNC: 4 MMOL/L (ref 3.5–5.1)
RBC # BLD AUTO: 4.65 MILL/UL (ref 4.7–6.1)
SODIUM SERPL-SCNC: 133 MMOL/L (ref 136–145)
WBC # BLD AUTO: 32.2 THOU/UL (ref 4.8–10.8)

## 2022-08-13 PROCEDURE — 5A09357 ASSISTANCE WITH RESPIRATORY VENTILATION, LESS THAN 24 CONSECUTIVE HOURS, CONTINUOUS POSITIVE AIRWAY PRESSURE: ICD-10-PCS | Performed by: INTERNAL MEDICINE

## 2022-08-13 RX ADMIN — VANCOMYCIN HYDROCHLORIDE SCH MLS: 10 INJECTION, POWDER, LYOPHILIZED, FOR SOLUTION INTRAVENOUS at 22:04

## 2022-08-13 RX ADMIN — VANCOMYCIN HYDROCHLORIDE SCH MLS: 10 INJECTION, POWDER, LYOPHILIZED, FOR SOLUTION INTRAVENOUS at 05:15

## 2022-08-13 RX ADMIN — VANCOMYCIN HYDROCHLORIDE SCH MLS: 10 INJECTION, POWDER, LYOPHILIZED, FOR SOLUTION INTRAVENOUS at 15:51

## 2022-08-14 LAB
ANION GAP SERPL CALC-SCNC: 12 MMOL/L (ref 10–20)
BASOPHILS # BLD AUTO: 0.1 THOU/UL (ref 0–0.2)
BASOPHILS NFR BLD AUTO: 0.4 % (ref 0–1)
BUN SERPL-MCNC: 10 MG/DL (ref 8.9–20.6)
CALCIUM SERPL-MCNC: 8.2 MG/DL (ref 7.8–10.44)
CHLORIDE SERPL-SCNC: 103 MMOL/L (ref 98–107)
CO2 SERPL-SCNC: 26 MMOL/L (ref 22–29)
CREAT CL PREDICTED SERPL C-G-VRATE: 518 ML/MIN (ref 70–130)
EOSINOPHIL # BLD AUTO: 0.1 THOU/UL (ref 0–0.7)
EOSINOPHIL NFR BLD AUTO: 0.7 % (ref 0–10)
GLUCOSE SERPL-MCNC: 105 MG/DL (ref 70–105)
HGB BLD-MCNC: 12.5 G/DL (ref 14–18)
LYMPHOCYTES # BLD: 1.5 THOU/UL (ref 1.2–3.4)
LYMPHOCYTES NFR BLD AUTO: 9.6 % (ref 21–51)
MCH RBC QN AUTO: 27.7 PG (ref 27–31)
MCV RBC AUTO: 87.9 FL (ref 78–98)
MONOCYTES # BLD AUTO: 2.1 THOU/UL (ref 0.11–0.59)
MONOCYTES NFR BLD AUTO: 14 % (ref 0–10)
NEUTROPHILS # BLD AUTO: 11.3 THOU/UL (ref 1.4–6.5)
NEUTROPHILS NFR BLD AUTO: 75.3 % (ref 42–75)
PLATELET # BLD AUTO: 134 THOU/UL (ref 130–400)
POTASSIUM SERPL-SCNC: 4 MMOL/L (ref 3.5–5.1)
RBC # BLD AUTO: 4.51 MILL/UL (ref 4.7–6.1)
SODIUM SERPL-SCNC: 137 MMOL/L (ref 136–145)
VANCOMYCIN TROUGH SERPL-MCNC: 14.8 UG/ML
WBC # BLD AUTO: 15.1 THOU/UL (ref 4.8–10.8)

## 2022-08-14 RX ADMIN — VANCOMYCIN HYDROCHLORIDE SCH MLS: 10 INJECTION, POWDER, LYOPHILIZED, FOR SOLUTION INTRAVENOUS at 16:07

## 2022-08-14 RX ADMIN — VANCOMYCIN HYDROCHLORIDE SCH MLS: 10 INJECTION, POWDER, LYOPHILIZED, FOR SOLUTION INTRAVENOUS at 21:59

## 2022-08-14 RX ADMIN — VANCOMYCIN HYDROCHLORIDE SCH MLS: 10 INJECTION, POWDER, LYOPHILIZED, FOR SOLUTION INTRAVENOUS at 05:51

## 2022-08-15 VITALS — SYSTOLIC BLOOD PRESSURE: 148 MMHG | DIASTOLIC BLOOD PRESSURE: 76 MMHG

## 2022-08-15 VITALS — TEMPERATURE: 97.9 F

## 2022-08-15 LAB
ANION GAP SERPL CALC-SCNC: 13 MMOL/L (ref 10–20)
BASOPHILS # BLD AUTO: 0.1 THOU/UL (ref 0–0.2)
BASOPHILS NFR BLD AUTO: 0.5 % (ref 0–1)
BUN SERPL-MCNC: 11 MG/DL (ref 8.9–20.6)
CALCIUM SERPL-MCNC: 8.2 MG/DL (ref 7.8–10.44)
CHLORIDE SERPL-SCNC: 103 MMOL/L (ref 98–107)
CO2 SERPL-SCNC: 26 MMOL/L (ref 22–29)
CREAT CL PREDICTED SERPL C-G-VRATE: 545 ML/MIN (ref 70–130)
EOSINOPHIL # BLD AUTO: 0.2 THOU/UL (ref 0–0.7)
EOSINOPHIL NFR BLD AUTO: 2 % (ref 0–10)
GLUCOSE SERPL-MCNC: 104 MG/DL (ref 70–105)
HGB BLD-MCNC: 12.3 G/DL (ref 14–18)
LYMPHOCYTES # BLD: 2.1 THOU/UL (ref 1.2–3.4)
LYMPHOCYTES NFR BLD AUTO: 18.5 % (ref 21–51)
MCH RBC QN AUTO: 26.9 PG (ref 27–31)
MCV RBC AUTO: 87.6 FL (ref 78–98)
MONOCYTES # BLD AUTO: 1.1 THOU/UL (ref 0.11–0.59)
MONOCYTES NFR BLD AUTO: 9.9 % (ref 0–10)
NEUTROPHILS # BLD AUTO: 7.8 THOU/UL (ref 1.4–6.5)
NEUTROPHILS NFR BLD AUTO: 69.1 % (ref 42–75)
PLATELET # BLD AUTO: 150 THOU/UL (ref 130–400)
POTASSIUM SERPL-SCNC: 3.9 MMOL/L (ref 3.5–5.1)
RBC # BLD AUTO: 4.57 MILL/UL (ref 4.7–6.1)
SODIUM SERPL-SCNC: 138 MMOL/L (ref 136–145)
VANCOMYCIN TROUGH SERPL-MCNC: 19.7 UG/ML
WBC # BLD AUTO: 11.3 THOU/UL (ref 4.8–10.8)

## 2022-08-15 RX ADMIN — VANCOMYCIN HYDROCHLORIDE SCH MLS: 10 INJECTION, POWDER, LYOPHILIZED, FOR SOLUTION INTRAVENOUS at 05:41

## 2023-03-23 ENCOUNTER — HOSPITAL ENCOUNTER (INPATIENT)
Dept: HOSPITAL 92 - ERS | Age: 33
LOS: 3 days | Discharge: HOME | DRG: 872 | End: 2023-03-26
Attending: FAMILY MEDICINE | Admitting: INTERNAL MEDICINE
Payer: COMMERCIAL

## 2023-03-23 VITALS — BODY MASS INDEX: 78.3 KG/M2

## 2023-03-23 DIAGNOSIS — F41.9: ICD-10-CM

## 2023-03-23 DIAGNOSIS — A41.9: Primary | ICD-10-CM

## 2023-03-23 DIAGNOSIS — Z87.891: ICD-10-CM

## 2023-03-23 DIAGNOSIS — L03.115: ICD-10-CM

## 2023-03-23 DIAGNOSIS — G47.33: ICD-10-CM

## 2023-03-23 DIAGNOSIS — E66.01: ICD-10-CM

## 2023-03-23 DIAGNOSIS — Z88.1: ICD-10-CM

## 2023-03-23 DIAGNOSIS — F32.A: ICD-10-CM

## 2023-03-23 LAB
ALBUMIN SERPL BCG-MCNC: 3.9 G/DL (ref 3.5–5)
ALP SERPL-CCNC: 78 U/L (ref 40–110)
ALT SERPL W P-5'-P-CCNC: 17 U/L (ref 8–55)
ANION GAP SERPL CALC-SCNC: 12 MMOL/L (ref 10–20)
AST SERPL-CCNC: 17 U/L (ref 5–34)
BILIRUB SERPL-MCNC: 1.5 MG/DL (ref 0.2–1.2)
BUN SERPL-MCNC: 8 MG/DL (ref 8.9–20.6)
CALCIUM SERPL-MCNC: 8.8 MG/DL (ref 7.8–10.44)
CHLORIDE SERPL-SCNC: 99 MMOL/L (ref 98–107)
CO2 SERPL-SCNC: 28 MMOL/L (ref 22–29)
CREAT CL PREDICTED SERPL C-G-VRATE: 0 ML/MIN (ref 70–130)
GLOBULIN SER CALC-MCNC: 4.4 G/DL (ref 2.4–3.5)
GLUCOSE SERPL-MCNC: 93 MG/DL (ref 70–105)
HGB BLD-MCNC: 14.3 G/DL (ref 14–18)
MCH RBC QN AUTO: 28.3 PG (ref 27–31)
MCV RBC AUTO: 88.2 FL (ref 78–98)
MDIFF COMPLETE?: YES
PLATELET # BLD AUTO: 156 10X3/UL (ref 130–400)
POTASSIUM SERPL-SCNC: 4.4 MMOL/L (ref 3.5–5.1)
RBC # BLD AUTO: 5.07 MILL/UL (ref 4.7–6.1)
SODIUM SERPL-SCNC: 135 MMOL/L (ref 136–145)
WBC # BLD AUTO: 34.9 10X3/UL (ref 4.8–10.8)

## 2023-03-23 PROCEDURE — 76999 ECHO EXAMINATION PROCEDURE: CPT

## 2023-03-23 PROCEDURE — 93010 ELECTROCARDIOGRAM REPORT: CPT

## 2023-03-23 PROCEDURE — 94660 CPAP INITIATION&MGMT: CPT

## 2023-03-23 PROCEDURE — 83880 ASSAY OF NATRIURETIC PEPTIDE: CPT

## 2023-03-23 PROCEDURE — 80053 COMPREHEN METABOLIC PANEL: CPT

## 2023-03-23 PROCEDURE — 93970 EXTREMITY STUDY: CPT

## 2023-03-23 PROCEDURE — 87040 BLOOD CULTURE FOR BACTERIA: CPT

## 2023-03-23 PROCEDURE — 93005 ELECTROCARDIOGRAM TRACING: CPT

## 2023-03-23 PROCEDURE — 85652 RBC SED RATE AUTOMATED: CPT

## 2023-03-23 PROCEDURE — 86140 C-REACTIVE PROTEIN: CPT

## 2023-03-23 PROCEDURE — 85025 COMPLETE CBC W/AUTO DIFF WBC: CPT

## 2023-03-23 PROCEDURE — 96365 THER/PROPH/DIAG IV INF INIT: CPT

## 2023-03-23 PROCEDURE — 36415 COLL VENOUS BLD VENIPUNCTURE: CPT

## 2023-03-23 PROCEDURE — 96375 TX/PRO/DX INJ NEW DRUG ADDON: CPT

## 2023-03-23 PROCEDURE — 80048 BASIC METABOLIC PNL TOTAL CA: CPT

## 2023-03-23 PROCEDURE — 96366 THER/PROPH/DIAG IV INF ADDON: CPT

## 2023-03-24 LAB
ANION GAP SERPL CALC-SCNC: 13 MMOL/L (ref 10–20)
BUN SERPL-MCNC: 9 MG/DL (ref 8.9–20.6)
CALCIUM SERPL-MCNC: 8.5 MG/DL (ref 7.8–10.44)
CHLORIDE SERPL-SCNC: 101 MMOL/L (ref 98–107)
CO2 SERPL-SCNC: 25 MMOL/L (ref 22–29)
CREAT CL PREDICTED SERPL C-G-VRATE: 420 ML/MIN (ref 70–130)
GLUCOSE SERPL-MCNC: 87 MG/DL (ref 70–105)
HGB BLD-MCNC: 13.6 G/DL (ref 14–18)
MCH RBC QN AUTO: 28.3 PG (ref 27–31)
MCV RBC AUTO: 89.8 FL (ref 78–98)
MDIFF COMPLETE?: YES
PLATELET # BLD AUTO: 154 10X3/UL (ref 130–400)
POLYCHROMASIA BLD QL SMEAR: (no result) (100X)
POTASSIUM SERPL-SCNC: 4 MMOL/L (ref 3.5–5.1)
RBC # BLD AUTO: 4.8 MILL/UL (ref 4.7–6.1)
SODIUM SERPL-SCNC: 135 MMOL/L (ref 136–145)
WBC # BLD AUTO: 25.5 10X3/UL (ref 4.8–10.8)

## 2023-03-24 RX ADMIN — CLINDAMYCIN PHOSPHATE SCH MLS: 600 INJECTION, SOLUTION INTRAVENOUS at 11:28

## 2023-03-24 RX ADMIN — CLINDAMYCIN PHOSPHATE SCH MLS: 600 INJECTION, SOLUTION INTRAVENOUS at 18:05

## 2023-03-25 LAB
ANION GAP SERPL CALC-SCNC: 11 MMOL/L (ref 10–20)
BASOPHILS # BLD AUTO: 0.1 THOU/UL (ref 0–0.2)
BASOPHILS NFR BLD AUTO: 0.4 % (ref 0–1)
BUN SERPL-MCNC: 13 MG/DL (ref 8.9–20.6)
CALCIUM SERPL-MCNC: 8.3 MG/DL (ref 7.8–10.44)
CHLORIDE SERPL-SCNC: 104 MMOL/L (ref 98–107)
CO2 SERPL-SCNC: 25 MMOL/L (ref 22–29)
CREAT CL PREDICTED SERPL C-G-VRATE: 437 ML/MIN (ref 70–130)
EOSINOPHIL # BLD AUTO: 0.3 THOU/UL (ref 0–0.7)
EOSINOPHIL NFR BLD AUTO: 2.2 % (ref 0–10)
GLUCOSE SERPL-MCNC: 103 MG/DL (ref 70–105)
HGB BLD-MCNC: 13 G/DL (ref 14–18)
LYMPHOCYTES # BLD: 1.8 THOU/UL (ref 1.2–3.4)
LYMPHOCYTES NFR BLD AUTO: 14.2 % (ref 21–51)
MCH RBC QN AUTO: 28.7 PG (ref 27–31)
MCV RBC AUTO: 89.6 FL (ref 78–98)
MONOCYTES # BLD AUTO: 1.7 THOU/UL (ref 0.11–0.59)
MONOCYTES NFR BLD AUTO: 14 % (ref 0–10)
NEUTROPHILS # BLD AUTO: 8.6 THOU/UL (ref 1.4–6.5)
NEUTROPHILS NFR BLD AUTO: 69.2 % (ref 42–75)
PLATELET # BLD AUTO: 144 10X3/UL (ref 130–400)
POTASSIUM SERPL-SCNC: 3.7 MMOL/L (ref 3.5–5.1)
RBC # BLD AUTO: 4.53 MILL/UL (ref 4.7–6.1)
SODIUM SERPL-SCNC: 136 MMOL/L (ref 136–145)
WBC # BLD AUTO: 12.4 10X3/UL (ref 4.8–10.8)

## 2023-03-25 RX ADMIN — CLINDAMYCIN PHOSPHATE SCH MLS: 600 INJECTION, SOLUTION INTRAVENOUS at 01:30

## 2023-03-25 RX ADMIN — Medication SCH ML: at 20:28

## 2023-03-25 RX ADMIN — Medication SCH ML: at 09:30

## 2023-03-25 RX ADMIN — Medication SCH ML: at 01:30

## 2023-03-25 RX ADMIN — CLINDAMYCIN PHOSPHATE SCH MLS: 600 INJECTION, SOLUTION INTRAVENOUS at 09:32

## 2023-03-25 RX ADMIN — CLINDAMYCIN PHOSPHATE SCH MLS: 600 INJECTION, SOLUTION INTRAVENOUS at 17:28

## 2023-03-26 VITALS — TEMPERATURE: 97.3 F | DIASTOLIC BLOOD PRESSURE: 76 MMHG | SYSTOLIC BLOOD PRESSURE: 142 MMHG

## 2023-03-26 LAB
ANION GAP SERPL CALC-SCNC: 11 MMOL/L (ref 10–20)
BASOPHILS # BLD AUTO: 0 THOU/UL (ref 0–0.2)
BASOPHILS NFR BLD AUTO: 0.3 % (ref 0–1)
BUN SERPL-MCNC: 17 MG/DL (ref 8.9–20.6)
CALCIUM SERPL-MCNC: 8.5 MG/DL (ref 7.8–10.44)
CHLORIDE SERPL-SCNC: 106 MMOL/L (ref 98–107)
CO2 SERPL-SCNC: 26 MMOL/L (ref 22–29)
CREAT CL PREDICTED SERPL C-G-VRATE: 425 ML/MIN (ref 70–130)
EOSINOPHIL # BLD AUTO: 0.3 THOU/UL (ref 0–0.7)
EOSINOPHIL NFR BLD AUTO: 3.9 % (ref 0–10)
GLUCOSE SERPL-MCNC: 93 MG/DL (ref 70–105)
HGB BLD-MCNC: 12.8 G/DL (ref 14–18)
LYMPHOCYTES # BLD: 2.3 THOU/UL (ref 1.2–3.4)
LYMPHOCYTES NFR BLD AUTO: 27.7 % (ref 21–51)
MCH RBC QN AUTO: 28.5 PG (ref 27–31)
MCV RBC AUTO: 89.9 FL (ref 78–98)
MONOCYTES # BLD AUTO: 0.9 THOU/UL (ref 0.11–0.59)
MONOCYTES NFR BLD AUTO: 11.2 % (ref 0–10)
NEUTROPHILS # BLD AUTO: 4.7 THOU/UL (ref 1.4–6.5)
NEUTROPHILS NFR BLD AUTO: 56.9 % (ref 42–75)
PLATELET # BLD AUTO: 165 10X3/UL (ref 130–400)
POTASSIUM SERPL-SCNC: 4 MMOL/L (ref 3.5–5.1)
RBC # BLD AUTO: 4.49 MILL/UL (ref 4.7–6.1)
SODIUM SERPL-SCNC: 139 MMOL/L (ref 136–145)
WBC # BLD AUTO: 8.2 10X3/UL (ref 4.8–10.8)

## 2023-03-26 RX ADMIN — Medication SCH ML: at 08:45

## 2023-03-26 RX ADMIN — CLINDAMYCIN PHOSPHATE SCH MLS: 600 INJECTION, SOLUTION INTRAVENOUS at 09:37

## 2023-03-26 RX ADMIN — CLINDAMYCIN PHOSPHATE SCH MLS: 600 INJECTION, SOLUTION INTRAVENOUS at 01:48

## 2023-08-04 ENCOUNTER — HOSPITAL ENCOUNTER (EMERGENCY)
Dept: HOSPITAL 92 - CSHERS | Age: 33
Discharge: HOME | End: 2023-08-04
Payer: COMMERCIAL

## 2023-08-04 DIAGNOSIS — Z87.891: ICD-10-CM

## 2023-08-04 DIAGNOSIS — I88.9: Primary | ICD-10-CM

## 2023-08-04 PROCEDURE — 99282 EMERGENCY DEPT VISIT SF MDM: CPT

## 2023-08-04 PROCEDURE — 96372 THER/PROPH/DIAG INJ SC/IM: CPT
